# Patient Record
Sex: FEMALE | Race: WHITE | Employment: FULL TIME | URBAN - METROPOLITAN AREA
[De-identification: names, ages, dates, MRNs, and addresses within clinical notes are randomized per-mention and may not be internally consistent; named-entity substitution may affect disease eponyms.]

---

## 2023-03-27 LAB
ALANINE AMINOTRANSFERASE (SGPT) (U/L) IN SER/PLAS: 18 U/L (ref 7–45)
ALBUMIN (G/DL) IN SER/PLAS: 4.4 G/DL (ref 3.4–5)
ALKALINE PHOSPHATASE (U/L) IN SER/PLAS: 77 U/L (ref 33–110)
ANION GAP IN SER/PLAS: 15 MMOL/L (ref 10–20)
ASPARTATE AMINOTRANSFERASE (SGOT) (U/L) IN SER/PLAS: 17 U/L (ref 9–39)
BILIRUBIN TOTAL (MG/DL) IN SER/PLAS: 0.6 MG/DL (ref 0–1.2)
CALCIDIOL (25 OH VITAMIN D3) (NG/ML) IN SER/PLAS: 50 NG/ML
CALCIUM (MG/DL) IN SER/PLAS: 9.6 MG/DL (ref 8.6–10.6)
CARBON DIOXIDE, TOTAL (MMOL/L) IN SER/PLAS: 28 MMOL/L (ref 21–32)
CHLORIDE (MMOL/L) IN SER/PLAS: 103 MMOL/L (ref 98–107)
CHOLESTEROL (MG/DL) IN SER/PLAS: 195 MG/DL (ref 0–199)
CHOLESTEROL IN HDL (MG/DL) IN SER/PLAS: 51.8 MG/DL
CHOLESTEROL/HDL RATIO: 3.8
CREATININE (MG/DL) IN SER/PLAS: 0.65 MG/DL (ref 0.5–1.05)
ESTIMATED AVERAGE GLUCOSE FOR HBA1C: 126 MG/DL
GFR FEMALE: >90 ML/MIN/1.73M2
GLUCOSE (MG/DL) IN SER/PLAS: 129 MG/DL (ref 74–99)
HEMOGLOBIN A1C/HEMOGLOBIN TOTAL IN BLOOD: 6 %
LDL: 115 MG/DL (ref 0–99)
POTASSIUM (MMOL/L) IN SER/PLAS: 4.7 MMOL/L (ref 3.5–5.3)
PROTEIN TOTAL: 7.2 G/DL (ref 6.4–8.2)
SODIUM (MMOL/L) IN SER/PLAS: 141 MMOL/L (ref 136–145)
THYROTROPIN (MIU/L) IN SER/PLAS BY DETECTION LIMIT <= 0.05 MIU/L: 3.71 MIU/L (ref 0.44–3.98)
TRIGLYCERIDE (MG/DL) IN SER/PLAS: 139 MG/DL (ref 0–149)
UREA NITROGEN (MG/DL) IN SER/PLAS: 19 MG/DL (ref 6–23)
VLDL: 28 MG/DL (ref 0–40)

## 2023-09-25 LAB
ANION GAP IN SER/PLAS: 14 MMOL/L (ref 10–20)
CALCIUM (MG/DL) IN SER/PLAS: 9.7 MG/DL (ref 8.6–10.6)
CARBON DIOXIDE, TOTAL (MMOL/L) IN SER/PLAS: 28 MMOL/L (ref 21–32)
CHLORIDE (MMOL/L) IN SER/PLAS: 103 MMOL/L (ref 98–107)
CREATININE (MG/DL) IN SER/PLAS: 0.63 MG/DL (ref 0.5–1.05)
ESTIMATED AVERAGE GLUCOSE FOR HBA1C: 111 MG/DL
GFR FEMALE: >90 ML/MIN/1.73M2
GLUCOSE (MG/DL) IN SER/PLAS: 117 MG/DL (ref 74–99)
HEMOGLOBIN A1C/HEMOGLOBIN TOTAL IN BLOOD: 5.5 %
POTASSIUM (MMOL/L) IN SER/PLAS: 4.3 MMOL/L (ref 3.5–5.3)
SODIUM (MMOL/L) IN SER/PLAS: 141 MMOL/L (ref 136–145)
UREA NITROGEN (MG/DL) IN SER/PLAS: 15 MG/DL (ref 6–23)

## 2023-10-02 DIAGNOSIS — M25.562 CHRONIC PAIN OF LEFT KNEE: Primary | ICD-10-CM

## 2023-10-02 DIAGNOSIS — F41.9 ANXIETY: ICD-10-CM

## 2023-10-02 DIAGNOSIS — G89.29 CHRONIC PAIN OF LEFT KNEE: Primary | ICD-10-CM

## 2023-10-02 RX ORDER — BUSPIRONE HYDROCHLORIDE 5 MG/1
1 TABLET ORAL 3 TIMES DAILY
COMMUNITY
Start: 2021-06-02 | End: 2023-10-02 | Stop reason: SDUPTHER

## 2023-10-02 RX ORDER — MELOXICAM 7.5 MG/1
7.5 TABLET ORAL DAILY
Qty: 30 TABLET | Refills: 1 | Status: SHIPPED | OUTPATIENT
Start: 2023-10-02 | End: 2023-12-01

## 2023-10-02 RX ORDER — BUSPIRONE HYDROCHLORIDE 5 MG/1
5 TABLET ORAL 3 TIMES DAILY
Qty: 90 TABLET | Refills: 1 | Status: SHIPPED | OUTPATIENT
Start: 2023-10-02 | End: 2023-12-04 | Stop reason: SDUPTHER

## 2023-10-02 RX ORDER — MELOXICAM 7.5 MG/1
7.5 TABLET ORAL 2 TIMES DAILY PRN
COMMUNITY
Start: 2023-09-05 | End: 2023-10-02 | Stop reason: SDUPTHER

## 2023-10-27 DIAGNOSIS — E03.9 ACQUIRED HYPOTHYROIDISM: Primary | ICD-10-CM

## 2023-10-27 PROBLEM — F51.04 PSYCHOPHYSIOLOGICAL INSOMNIA: Status: ACTIVE | Noted: 2023-10-27

## 2023-10-27 PROBLEM — T75.3XXA MOTION SICKNESS: Status: ACTIVE | Noted: 2023-10-27

## 2023-10-27 PROBLEM — H93.13 TINNITUS OF BOTH EARS: Status: ACTIVE | Noted: 2023-10-27

## 2023-10-27 PROBLEM — F41.9 ANXIETY AND DEPRESSION: Status: ACTIVE | Noted: 2023-10-27

## 2023-10-27 PROBLEM — H90.3 SENSORINEURAL HEARING LOSS, BILATERAL: Status: ACTIVE | Noted: 2023-10-27

## 2023-10-27 PROBLEM — E78.5 HYPERLIPIDEMIA, MILD: Status: ACTIVE | Noted: 2023-10-27

## 2023-10-27 PROBLEM — R73.03 PREDIABETES: Status: ACTIVE | Noted: 2023-10-27

## 2023-10-27 PROBLEM — R42 DIZZINESS AND GIDDINESS: Status: ACTIVE | Noted: 2023-10-27

## 2023-10-27 PROBLEM — F32.A ANXIETY AND DEPRESSION: Status: ACTIVE | Noted: 2023-10-27

## 2023-10-27 PROBLEM — F41.1 GENERALIZED ANXIETY DISORDER: Status: ACTIVE | Noted: 2023-10-27

## 2023-10-29 RX ORDER — LEVOTHYROXINE SODIUM 50 UG/1
50 TABLET ORAL DAILY
Qty: 90 TABLET | Refills: 0 | Status: SHIPPED | OUTPATIENT
Start: 2023-10-29 | End: 2024-01-25

## 2023-11-01 RX ORDER — DAPSONE 50 MG/G
GEL TOPICAL
COMMUNITY
Start: 2023-10-25

## 2023-11-01 RX ORDER — METFORMIN HYDROCHLORIDE 500 MG/1
500 TABLET ORAL 2 TIMES DAILY
COMMUNITY
End: 2024-01-16 | Stop reason: SDUPTHER

## 2023-11-01 RX ORDER — CITALOPRAM 20 MG/1
20 TABLET, FILM COATED ORAL DAILY
COMMUNITY
End: 2024-03-25 | Stop reason: SDUPTHER

## 2023-11-01 RX ORDER — RALOXIFENE HYDROCHLORIDE 60 MG/1
60 TABLET, FILM COATED ORAL DAILY
COMMUNITY

## 2023-11-01 RX ORDER — VIT C/E/ZN/COPPR/LUTEIN/ZEAXAN 250MG-90MG
CAPSULE ORAL
COMMUNITY
Start: 2022-06-09

## 2023-11-01 RX ORDER — ZINC GLUCONATE 50 MG
1 TABLET ORAL DAILY
COMMUNITY
Start: 2022-06-09

## 2023-11-01 RX ORDER — PRAVASTATIN SODIUM 40 MG/1
40 TABLET ORAL EVERY EVENING
COMMUNITY
End: 2023-11-05 | Stop reason: SDUPTHER

## 2023-11-02 ENCOUNTER — EVALUATION (OUTPATIENT)
Dept: PHYSICAL THERAPY | Facility: CLINIC | Age: 58
End: 2023-11-02
Payer: COMMERCIAL

## 2023-11-02 DIAGNOSIS — M62.81 MUSCLE WEAKNESS ON EXAMINATION: ICD-10-CM

## 2023-11-02 DIAGNOSIS — M25.562 ACUTE PAIN OF LEFT KNEE: Primary | ICD-10-CM

## 2023-11-02 DIAGNOSIS — E78.5 HYPERLIPIDEMIA, MILD: Primary | ICD-10-CM

## 2023-11-02 PROCEDURE — 97110 THERAPEUTIC EXERCISES: CPT | Mod: GP

## 2023-11-02 PROCEDURE — 97161 PT EVAL LOW COMPLEX 20 MIN: CPT | Mod: GP

## 2023-11-02 NOTE — PROGRESS NOTES
Physical Therapy  Physical Therapy Orthopedic Evaluation    Patient Name: Destiny Naranjo  MRN: [id number]  Today's Date: 11/2/2023  Time Calculation  Start Time: 1120  Stop Time: 1210  Time Calculation (min): 50 min    Insurance:  Visit number: 1 of 50  Authorization info: yes  Insurance Type: [place]  Certification dates: after approval will know     Current Problem  1. Acute pain of left knee  Follow Up In Physical Therapy      2. Muscle weakness on examination  Follow Up In Physical Therapy          Referred by: Janae Lambert PA-C    General:     Left knee pain  Precautions:     Has a history of low back pain  Medical History Form: Reviewed (scanned into chart)    Subjective:   Patient  is a 59 yo female who presents today with c/o left knee pain.  Patient  reports that she has occasional buckling and feelings of giving out.  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  Patient  reports that that she started slowly favored her right leg because of the left leg.  She reports that she did get some shooting pain down the left with a change of brace.  Stopped wearing the short brace, wearing larger brace on and off ( recently stopped wearing the large brace), taking meloxicam and icing she had some mild improvement over the next two weeks.  Chief Complaint: left knee pain  Onset: over year  DAVID: nothing specific        Pain:     Location: lateral pain in the knee, proximal joint line, warmth around the medial joint line  Description: intermittent sharp pain, achiness  At rest: 2/10  At worst: 9/10   Aggravating Factors:  Bending/Straightening Knee, Standing, Walking, Sitting, Squatting, and Stairs, depending on foot location  Relieving Factors:  ice, larger brace, Voltaren gel , stopped brace for the last two weeks stopped all braces     Relevant Information (PMH & Previous Tests/Imaging): no x-rays  at this time  Previous Interventions/Treatments: brace, ( one brace helped, one brace made it worst)  ice, Voltaren gel ( has helped a lot)    Prior Level of Function (PLOF)  Patient previously independent with all ADLs  Exercise/Physical Activity: [facility] ( [employer]) but hard time getting there  Work/School:  [employer]id, [employer]'s work all day ( 2 hand weights)      Patients Living Environment: Reviewed and no concern   Primary Language: English  Patient's Goal(s) for Therapy:  don't wan to relay on medication and learn how to take care of her knee    Red Flags: Do you have any of the following? No  Fever/chills, unexplained weight changes, dizziness/fainting, unexplained change in bowel or bladder functions, unexplained malaise or muscle weakness, night pain/sweats, numbness or tingling    Objective:  Objective     Palpation:  positive TTP over the medial joint line and the lateral joint line of the left knee     Hip AROM  Hip AROM WFL: yes     Lumbar AROM WFL unless documented below     Hip AROM WFL unless documented below  Hip AROM WFL: yes  Hip PROM WFL unless documented below     Specific Lower Extremity MMT WFL unless documented below  R Iliopsoas: (5/5): 4-  L Iliopsoas: (5/5): 4-  R Gluteals (prone): (5/5): 3+ HK  L Gluteals (prone): (5/5): 3+ HK  R knee flexion: (5/5): 4  L knee flexion: (5/5): 4-  R knee extension: (5/5): 4  L knee extension: (5/5): 4-    Knee AROM WFL unless documented below  R knee flexion: (140°): no pain into resistance  L knee flexion: (140°): slight discomfort into resistance  R knee extension: (0°): 0  L knee extension: (0°): 5  Knee PROM WFL unless documented below     Knee MMT WFL unless documented below     DTR WFL unless documented below     Special Tests Negative unless documented below          Posture: WFL    Lower Extremity Functional Movements  Transfers: increase for effort sit to stand, slight shift to the right  Gait: slight decrease in stance time  on the left LE  Stairs:  increase in effort with leading with the left going up and down stairs, decrease in eccentric control on the left  SL Balance: will test next visit  DL Squat: decrease in strength on the left per patient, slight shift to the right  SL Squat: NT        Outcome Measures:  Other Measures  Lower Extremity Funtional Score (LEFS): 42/80     Treatments:  Access Code: [email or web address]  URL: [email or web address]  Date: 11/02/2023  Prepared by: Elida Lopez    Exercises  - Supine Heel Slides  - 4-7 x weekly - 1-3 sets - 12-15 reps  - Seated Knee Flexion Extension AROM   - 4 x weekly - 1-3 sets - 12-15 reps  - Supine Quad Set  - 4-7 x weekly - 1-3 sets - 12-15 reps - 6 hold  - Hooklying Straight Leg Raise  - 4 x weekly - 1-3 sets - 12-15 reps  - Long Sitting Calf Stretch with Strap  - 4 x weekly - 1-3 sets - 30-60  hold  - Standing Single Leg Stance with Unilateral Counter Support  - 4 x weekly - 1-3 sets - 5-10 reps - 10 hold  - Seated Long Arc Quad  - 4 x weekly - 1-3 sets - 12-15 reps    EDUCATION: Home exercise program, plan of care, activity modifications, pain management, and injury pathology  Education  Individual(s) Educated: Patient    Assessment: Patient presents with signs and symptoms consistent with left knee pain secondary to degenerative changes of the knee/mensicus , resulting in limited participation in pain-free ADLs and inability to perform at their prior level of function. Pt would benefit from physical therapy to address the impairments found & listed previously in the objective section in order to return to safe and pain-free ADLs and prior level of function.  PT Assessment Results: Decreased strength, Decreased range of motion, Impaired balance, Decreased mobility, Pain  Rehab Prognosis: Good    Plan:  Treatment/Interventions: Cryotherapy, Education/ Instruction, Electrical stimulation, Gait training, Manual therapy, Neuromuscular re-education, Therapeutic  exercises  PT Plan: Skilled PT  PT Frequency: 1 time per week  Duration: 8 weeks  Onset Date: 11/02/23  Rehab Potential: Good  Plan of Care Agreement: Patient  Planned Interventions include: therapeutic exercise, self-care home management, manual therapy, therapeutic activities, gait training, neuromuscular coordination, vasopneumatic, dry needling, aquatic therapy  Frequency: 1-2 x Week  Duration: 8 Weeks  Goals: Set and discussed today    1.  Patient will report < 2/10 in left knee pain with light physical activity.   2.  Patient will demonstrate  4+ /5 in left LE musculature.   3.  Patient will be able to stand on LE  in SLS with minimal UE assistance for >20 secs to improve dynamic stability necessary for LE dressing.   4.   Patient will be able to successfully ascent/descend 1 flight of stairs reciprocally with the use of 1 hand rail.   5.   Patient will report > 5 on GROC scale.   6.  Patient will be independent with HEP.   7.  Patient  will improve LEFS score form 42/80 to > 65/80.   8.  Patient  will be able to demonstrate full active knee extension without any increase in irritability.   Plan of care was developed with input and agreement by the patient      Elida Lopez, PT

## 2023-11-02 NOTE — Clinical Note
November 2, 2023     Patient: Destiny Naranjo   YOB: 1965   Date of Visit: 11/2/2023       To Whom It May Concern:    It is my medical opinion that Destiny Naranjo {Work release (duty restriction):11179}.    If you have any questions or concerns, please don't hesitate to call.         Sincerely,        Elida Lopez, PT    CC: No Recipients

## 2023-11-02 NOTE — Clinical Note
November 2, 2023     Patient: Destiny Naranjo   YOB: 1965   Date of Visit: 11/2/2023       To Whom it May Concern:    Destiny Naranjo was seen in my clinic on 11/2/2023. She {Return to school/sport:07336}.    If you have any questions or concerns, please don't hesitate to call.         Sincerely,          Elida Lopez, PT        CC: No Recipients

## 2023-11-05 RX ORDER — PRAVASTATIN SODIUM 40 MG/1
40 TABLET ORAL NIGHTLY
Qty: 90 TABLET | Refills: 0 | Status: SHIPPED | OUTPATIENT
Start: 2023-11-05 | End: 2024-02-05

## 2023-11-16 ENCOUNTER — APPOINTMENT (OUTPATIENT)
Dept: PHYSICAL THERAPY | Facility: CLINIC | Age: 58
End: 2023-11-16
Payer: COMMERCIAL

## 2023-11-21 ENCOUNTER — TREATMENT (OUTPATIENT)
Dept: PHYSICAL THERAPY | Facility: CLINIC | Age: 58
End: 2023-11-21
Payer: COMMERCIAL

## 2023-11-21 DIAGNOSIS — M25.562 ACUTE PAIN OF LEFT KNEE: Primary | ICD-10-CM

## 2023-11-21 DIAGNOSIS — M62.81 MUSCLE WEAKNESS ON EXAMINATION: ICD-10-CM

## 2023-11-21 PROCEDURE — 97110 THERAPEUTIC EXERCISES: CPT | Mod: GP

## 2023-11-21 PROCEDURE — 97140 MANUAL THERAPY 1/> REGIONS: CPT | Mod: GP

## 2023-11-21 NOTE — PROGRESS NOTES
Physical Therapy  Physical Therapy Treatment Note    Patient Name: Destiny Naranjo  MRN: [id number]  Today's Date: 11/21/2023  Time Calculation  Start Time: 1145  Stop Time: 1230  Time Calculation (min): 45 min    Insurance:  Visit number: 2 of 50  Authorization no  Certification dates:      Current Problem  1. Acute pain of left knee  Follow Up In Physical Therapy      2. Muscle weakness on examination  Follow Up In Physical Therapy            Precautions     Has history of low back pain   Subjective:     Patient reports she will get lateral side of the left knee and some and intermittent periods of pain in left lower leg.      Pain       Objective:         Treatments:   - Bike x 6 min ROM  - passive ROM of the left knee  - s/l IT band with roller stick, and theragun left knee, lateral quad, posterior lateral hamstring region K tape for stabilization ( hands on total time 23 min)  - walk in the clinic   - review of rolling techniques in the bed  - review of HEP and guidelines        Assessment:    Pt had a decrease in knee soreness with the tape. She felt she had improvement in stability and greater confidence in the knee with the tape       Plan: continue to work on knee strengthening, hip strengthening, and core/trunk stability       Goals:  Active       PT Problem       PT Goal 1       Start:  11/02/23    Expected End:  01/01/24       1.  Patient will report < 2/10 in left knee pain with light physical activity.   2.  Patient will demonstrate  4+ /5 in left LE musculature.   3.  Patient will be able to stand on LE  in SLS with minimal UE assistance for >20 secs to improve dynamic stability necessary for LE dressing.   4.   Patient will be able to successfully ascent/descend 1 flight of stairs reciprocally with the use of 1 hand rail.   5.   Patient will report > 5 on GROC scale.   6.  Patient will be independent with HEP.   Plan of care was developed with input and agreement by the patient                   Elida Lopez, PT

## 2023-12-04 DIAGNOSIS — F41.9 ANXIETY: ICD-10-CM

## 2023-12-04 RX ORDER — BUSPIRONE HYDROCHLORIDE 5 MG/1
5 TABLET ORAL 3 TIMES DAILY
Qty: 90 TABLET | Refills: 1 | Status: SHIPPED | OUTPATIENT
Start: 2023-12-04 | End: 2024-02-01

## 2023-12-05 NOTE — PROGRESS NOTES
Physical Therapy  Physical Therapy Treatment Note    Patient Name: Destiny Naranjo  MRN: [id number]  Today's Date: 12/7/2023  Time Calculation  Start Time: 1200  Stop Time: 1245  Time Calculation (min): 45 min    Insurance:  Visit number: 3 of 12   Authorization no  Certification dates:      Current Problem  1. Acute pain of left knee        2. Muscle weakness on examination                Precautions     Has history of low back pain   Subjective:   Patient reports that the knee still has the lateral pain and will get the intermittent periods of sharp pain     Pain       Objective:         Treatments:   - Bike x 6 min ROM  - passive ROM of the left knee  - extensive review of taping technique, with video recording on how to perform,   Walk in clinic  - tendon loading program:  kick into strap hold for 45 secs off for 1 min 15 secs repeat 3 times ( loading program for home 3-5 times  - review of lactic acid build up and putting the legs up at night  - tendon loading program: knee extension x 45 secs on 1 min 15 secs x 3 reps  Hip abd x 45 secs on 1 min 15 secs x 3 reps   - - review of HEP and guidelines    Manual:  1  TE x 2        Assessment:         Pt had a decrease in pain in the left knee after the session.  Pt had a much better understanding on the proper taping technique today.  Going to try on her own at home.    Plan: continue to work on knee strengthening, hip strengthening, and core/trunk stability       Goals:  Active       PT Problem       PT Goal 1       Start:  11/02/23    Expected End:  01/01/24       1.  Patient will report < 2/10 in left knee pain with light physical activity.   2.  Patient will demonstrate  4+ /5 in left LE musculature.   3.  Patient will be able to stand on LE  in SLS with minimal UE assistance for >20 secs to improve dynamic stability necessary for LE dressing.   4.   Patient will be able to successfully ascent/descend 1 flight of stairs reciprocally with the use of 1 hand  ron.   5.   Patient will report > 5 on GROC scale.   6.  Patient will be independent with HEP.   Plan of care was developed with input and agreement by the patient                  Elida Lopez PT

## 2023-12-07 ENCOUNTER — TREATMENT (OUTPATIENT)
Dept: PHYSICAL THERAPY | Facility: CLINIC | Age: 58
End: 2023-12-07
Payer: COMMERCIAL

## 2023-12-07 DIAGNOSIS — M25.562 ACUTE PAIN OF LEFT KNEE: Primary | ICD-10-CM

## 2023-12-07 DIAGNOSIS — M62.81 MUSCLE WEAKNESS ON EXAMINATION: ICD-10-CM

## 2023-12-07 PROCEDURE — 97110 THERAPEUTIC EXERCISES: CPT | Mod: GP

## 2023-12-07 PROCEDURE — 97140 MANUAL THERAPY 1/> REGIONS: CPT | Mod: GP

## 2023-12-08 NOTE — PROGRESS NOTES
Physical Therapy  Physical Therapy Treatment Note    Patient Name: Destiny Naranjo  MRN: [id number]  Today's Date: 12/12/2023  Time Calculation  Start Time: 1245  Stop Time: 1330  Time Calculation (min): 45 min    Insurance:  Visit number: 4 of 12   Authorization no  Certification dates:      Current Problem  1. Acute pain of left knee        2. Muscle weakness on examination                  Precautions     Has history of low back pain   Subjective:     She reports that the knee is still sore with full knee extension without a towel under the knee.   Pain       Objective:         Treatments:   - Bike x 6 min 2 hills level 2   - shuttle recovery hold 25 lbs x 30 reps,   - quad set into medium bolster x 10 secs x 15 reps  - quad set with SLR x 12 reps   - mobilization with movement Extension x with holds   - STM over the lateral knee, fibular head, lateral IT band x hands on 8 min         Manual:  1  TE x 2        Assessment:         Patient had increase in reactivity down the lateral lower leg with deeper knee flexion on the shuttle. It went away almost immediately once she got up. Had better tolerance of the quad set in general     Plan: continue to work on knee strengthening, hip strengthening, and core/trunk stability       Goals:  Active       PT Problem       PT Goal 1       Start:  11/02/23    Expected End:  01/01/24       1.  Patient will report < 2/10 in left knee pain with light physical activity.   2.  Patient will demonstrate  4+ /5 in left LE musculature.   3.  Patient will be able to stand on LE  in SLS with minimal UE assistance for >20 secs to improve dynamic stability necessary for LE dressing.   4.   Patient will be able to successfully ascent/descend 1 flight of stairs reciprocally with the use of 1 hand rail.   5.   Patient will report > 5 on GROC scale.   6.  Patient will be independent with HEP.   Plan of care was developed with input and agreement by the patient                  Elida VALENCIA  John, PT

## 2023-12-12 ENCOUNTER — TREATMENT (OUTPATIENT)
Dept: PHYSICAL THERAPY | Facility: CLINIC | Age: 58
End: 2023-12-12
Payer: COMMERCIAL

## 2023-12-12 DIAGNOSIS — M62.81 MUSCLE WEAKNESS ON EXAMINATION: ICD-10-CM

## 2023-12-12 DIAGNOSIS — M25.562 ACUTE PAIN OF LEFT KNEE: Primary | ICD-10-CM

## 2023-12-12 PROCEDURE — 97110 THERAPEUTIC EXERCISES: CPT | Mod: GP

## 2023-12-12 PROCEDURE — 97140 MANUAL THERAPY 1/> REGIONS: CPT | Mod: GP

## 2023-12-26 ENCOUNTER — TREATMENT (OUTPATIENT)
Dept: PHYSICAL THERAPY | Facility: CLINIC | Age: 58
End: 2023-12-26
Payer: COMMERCIAL

## 2023-12-26 DIAGNOSIS — M62.81 MUSCLE WEAKNESS ON EXAMINATION: ICD-10-CM

## 2023-12-26 DIAGNOSIS — M25.562 ACUTE PAIN OF LEFT KNEE: Primary | ICD-10-CM

## 2023-12-26 PROCEDURE — 97140 MANUAL THERAPY 1/> REGIONS: CPT | Mod: GP

## 2023-12-26 PROCEDURE — 97110 THERAPEUTIC EXERCISES: CPT | Mod: GP

## 2023-12-26 NOTE — PROGRESS NOTES
Physical Therapy  Physical Therapy Treatment Note    Patient Name: Destiny Naranjo  MRN: [id number]  Today's Date: 12/26/2023  Time Calculation  Start Time: 1245  Stop Time: 1330  Time Calculation (min): 45 min    Insurance:  Visit number: 5 of 12   Authorization no  Certification dates:      Current Problem  1. Acute pain of left knee        2. Muscle weakness on examination                    Precautions     Has history of low back pain   Subjective:   Patient reports that she thinks she had a skin reaction with the tape. She feels better with tape, but had lots of itching, redness, and swelling at the ends ( tape she has at home)  Pain       Objective:         Treatments:   - extensive review of K tape, PT POC, tape pre, tension with the taping procedure  - LAQ x 2.5 lbs x 15 reps x 6 secs each side   -  wall slide hold 6 secs x 10 reps   - SLR x 12 reps with quad set  - mobilization with movement Extension x with holds, patellar mobs  - STM over the lateral knee, fibular head, lateral IT band x hands on 8 min         Manual:  1  TE x 2        Assessment:    Patient tolerated the session well.  Patient had not increase in reactivity during the session. Going to see if she has an allergic reaction with our tape and focused on placement of the tape with no tension at the anchors. Secondary to patient's continued pain in the lateral compartment, advised patient to discuss referral with primary to ortho specialist at this time     Plan: continue to work on knee strengthening, hip strengthening, and core/trunk stability       Goals:  Active       PT Problem       PT Goal 1       Start:  11/02/23    Expected End:  01/01/24       1.  Patient will report < 2/10 in left knee pain with light physical activity.   2.  Patient will demonstrate  4+ /5 in left LE musculature.   3.  Patient will be able to stand on LE  in SLS with minimal UE assistance for >20 secs to improve dynamic stability necessary for LE dressing.   4.    Patient will be able to successfully ascent/descend 1 flight of stairs reciprocally with the use of 1 hand rail.   5.   Patient will report > 5 on GROC scale.   6.  Patient will be independent with HEP.   Plan of care was developed with input and agreement by the patient                  Elida Lopez PT

## 2024-01-03 ENCOUNTER — APPOINTMENT (OUTPATIENT)
Dept: PRIMARY CARE | Facility: CLINIC | Age: 59
End: 2024-01-03
Payer: COMMERCIAL

## 2024-01-16 ENCOUNTER — TREATMENT (OUTPATIENT)
Dept: PHYSICAL THERAPY | Facility: CLINIC | Age: 59
End: 2024-01-16
Payer: COMMERCIAL

## 2024-01-16 ENCOUNTER — OFFICE VISIT (OUTPATIENT)
Dept: PRIMARY CARE | Facility: CLINIC | Age: 59
End: 2024-01-16
Payer: COMMERCIAL

## 2024-01-16 VITALS
DIASTOLIC BLOOD PRESSURE: 80 MMHG | RESPIRATION RATE: 16 BRPM | SYSTOLIC BLOOD PRESSURE: 124 MMHG | OXYGEN SATURATION: 99 % | HEART RATE: 76 BPM | WEIGHT: 163 LBS | BODY MASS INDEX: 27.98 KG/M2

## 2024-01-16 DIAGNOSIS — F32.A ANXIETY AND DEPRESSION: Primary | ICD-10-CM

## 2024-01-16 DIAGNOSIS — E03.9 HYPOTHYROIDISM, UNSPECIFIED TYPE: ICD-10-CM

## 2024-01-16 DIAGNOSIS — E11.9 DIABETES MELLITUS WITHOUT COMPLICATION (MULTI): ICD-10-CM

## 2024-01-16 DIAGNOSIS — M62.81 MUSCLE WEAKNESS ON EXAMINATION: ICD-10-CM

## 2024-01-16 DIAGNOSIS — E78.5 HYPERLIPIDEMIA, MILD: ICD-10-CM

## 2024-01-16 DIAGNOSIS — L30.9 DERMATITIS: ICD-10-CM

## 2024-01-16 DIAGNOSIS — F41.9 ANXIETY AND DEPRESSION: Primary | ICD-10-CM

## 2024-01-16 DIAGNOSIS — M25.562 ACUTE PAIN OF LEFT KNEE: Primary | ICD-10-CM

## 2024-01-16 PROCEDURE — 97140 MANUAL THERAPY 1/> REGIONS: CPT | Mod: GP

## 2024-01-16 PROCEDURE — 97110 THERAPEUTIC EXERCISES: CPT | Mod: GP

## 2024-01-16 PROCEDURE — 1036F TOBACCO NON-USER: CPT | Performed by: PHYSICIAN ASSISTANT

## 2024-01-16 PROCEDURE — 3079F DIAST BP 80-89 MM HG: CPT | Performed by: PHYSICIAN ASSISTANT

## 2024-01-16 PROCEDURE — 99214 OFFICE O/P EST MOD 30 MIN: CPT | Performed by: PHYSICIAN ASSISTANT

## 2024-01-16 PROCEDURE — 3074F SYST BP LT 130 MM HG: CPT | Performed by: PHYSICIAN ASSISTANT

## 2024-01-16 RX ORDER — METFORMIN HYDROCHLORIDE 500 MG/1
500 TABLET ORAL 2 TIMES DAILY
Qty: 180 TABLET | Refills: 1 | Status: SHIPPED | OUTPATIENT
Start: 2024-01-16

## 2024-01-16 ASSESSMENT — ENCOUNTER SYMPTOMS
BACK PAIN: 0
VOMITING: 0
CARDIOVASCULAR NEGATIVE: 1
HEADACHES: 0
NEUROLOGICAL NEGATIVE: 1
EYES NEGATIVE: 1
ENDOCRINE NEGATIVE: 1
ALLERGIC/IMMUNOLOGIC NEGATIVE: 1
ARTHRALGIAS: 0
OCCASIONAL FEELINGS OF UNSTEADINESS: 0
DIZZINESS: 0
PSYCHIATRIC NEGATIVE: 1
COUGH: 0
SHORTNESS OF BREATH: 0
NERVOUS/ANXIOUS: 0
LOSS OF SENSATION IN FEET: 0
CONSTITUTIONAL NEGATIVE: 1
NAUSEA: 0
MUSCULOSKELETAL NEGATIVE: 1
PALPITATIONS: 0
WHEEZING: 0
GASTROINTESTINAL NEGATIVE: 1
DEPRESSION: 0
RESPIRATORY NEGATIVE: 1
ABDOMINAL PAIN: 0
HEMATOLOGIC/LYMPHATIC NEGATIVE: 1

## 2024-01-16 ASSESSMENT — PATIENT HEALTH QUESTIONNAIRE - PHQ9
1. LITTLE INTEREST OR PLEASURE IN DOING THINGS: NOT AT ALL
SUM OF ALL RESPONSES TO PHQ9 QUESTIONS 1 AND 2: 0
2. FEELING DOWN, DEPRESSED OR HOPELESS: NOT AT ALL

## 2024-01-16 NOTE — PROGRESS NOTES
Subjective   Patient ID: Destiny Naranjo is a 58 y.o. female who presents for No chief complaint on file..    HPI     Review of Systems   Constitutional: Negative.    HENT: Negative.     Eyes: Negative.    Respiratory: Negative.  Negative for cough, shortness of breath and wheezing.    Cardiovascular: Negative.  Negative for chest pain and palpitations.   Gastrointestinal: Negative.  Negative for abdominal pain, nausea and vomiting.   Endocrine: Negative.    Genitourinary: Negative.    Musculoskeletal: Negative.  Negative for arthralgias and back pain.        Left knee and thigh pain    Skin: Negative.  Negative for rash.   Allergic/Immunologic: Negative.    Neurological: Negative.  Negative for dizziness and headaches.   Hematological: Negative.    Psychiatric/Behavioral: Negative.  The patient is not nervous/anxious.         Hx anxiety and depression        Objective   /80 (BP Location: Right arm, Patient Position: Sitting)   Pulse 76   Resp 16   Wt 73.9 kg (163 lb)   SpO2 99%   BMI 27.98 kg/m²     Physical Exam  Vitals and nursing note reviewed.   Constitutional:       Appearance: Normal appearance.   Cardiovascular:      Rate and Rhythm: Normal rate and regular rhythm.   Pulmonary:      Effort: Pulmonary effort is normal.      Breath sounds: Normal breath sounds.   Musculoskeletal:      Cervical back: Neck supple.   Skin:     Findings: Rash present.      Comments: Dry skin under left eye  - under chin and near lips as well    Neurological:      General: No focal deficit present.      Mental Status: She is alert and oriented to person, place, and time.   Psychiatric:         Mood and Affect: Mood normal.         Behavior: Behavior normal.         Thought Content: Thought content normal.         Judgment: Judgment normal.       Assessment/Plan   Problem List Items Addressed This Visit       Hypothyroidism    Hyperlipidemia, mild    Anxiety and depression - Primary     Other Visit Diagnoses        Diabetes mellitus without complication (CMS/HCC)        Relevant Medications    metFORMIN (Glucophage) 500 mg tablet    Dermatitis                 knee pain - RICE- not taking meloxicam - improving with  PT         .hypothyroid   -stable, controlled, continue medication as directed  - TSH UTD   -f/u in 3 months or sooner if needed      hyperlipidemia   -Continue current medication as directed  -Encouraged following a low-fat low-cholesterol diet   -Discussed the benefits of regular aerobic exercise and weight loss  -Encouraged following a low-carb healthy oil intake diet      anxiety and depression   -stable, controlled, continue medication as directed  Reviewed diagnostic impression, education about situational anxiety, depression, etiology, treatment recommendations including medication, therapy, course of treatment and prognosis  Reviewed r/b/a, possible side effects of the medication.   Patient is aware about the benefits outweigh the risks.  Advised to see therapy/psych   Patient is reminded that if there is SI to call 911 and get themselves to the closest ED for evaluation     prediabetes   -Stable, controlled, last A1c 5.5- only one metformin per day   -Ophthalmology for evaluation and management of diabetic eye changes   -Encouraged regular aerobic exercise weight loss and diabetic diet ADA  -Discussed diabetic education issues of long-term diabetic complications, hyperglycemic symptoms, diet, and dictations-side effects and need for compliance, importance of exercise and use and side effects of insulin with patient  -Follow up in 3-4 months should any issues arise before then f/u sooner      Dermatitis - followed by dermatology - OTC cortisone cream      complexity visit of 35+ minutes face-to-face with at least half of the time discussing  Results of my examination, clinical findings, impression and diagnoses discussed with the patient as well as results of the latest blood work. The patient was in  agreement with the plan and verbalized a good understanding of instructions and plans for treatment. At the end of the visit today, the patient felt that all questions had been answered satisfactorily. The patient was pleased with the visit and very appreciative for the care rendered.

## 2024-01-16 NOTE — PROGRESS NOTES
Physical Therapy  Physical Therapy Treatment Note    Patient Name: Destiny Naranjo  MRN: [id number]  Today's Date: 1/16/2024  Time Calculation  Start Time: 1145  Stop Time: 1230  Time Calculation (min): 45 min    Insurance:  Visit number: 6 of 12   Authorization no  Certification dates:      Current Problem  1. Acute pain of left knee        2. Muscle weakness on examination                      Precautions     Has history of low back pain   Subjective:   Still having the lateral leg pain, still unable to fully extend the knee, pain into resistance with full knee flexion  Will get fatigued after extended periods of standing   Pain     On average:  5-7/10 into extension, shooting pain lateral posterior ridge  Objective:   Knee extension:  unable to       Treatments:   - Bike x 5 min level 1  - standing gastroc stretch x 2 min   - standing lean in SLS 10 secs x 10 reps  - step up on 6 in step   - discussion regarding compression brace vs the taping  - heel slide x 25 reps A/AROM  - quad set x into small bolster x 15 reps   - Over the edge of the mat x distraction with mobilization x 8 min   - patellar mob med and lateral  - pre-gait with stance on the right side and focus on mobility on the right  - walk in the clinic   - review of HEP and guidelines            Manual:  1  TE x 2        Assessment:    Pt continues to have decrease in knee flexion and knee extension mobility. She has antalgic gait    Plan: continue to work on knee strengthening, hip strengthening, and core/trunk stability       Goals:  Active       PT Problem       PT Goal 1       Start:  11/02/23    Expected End:  01/01/24       1.  Patient will report < 2/10 in left knee pain with light physical activity.   2.  Patient will demonstrate  4+ /5 in left LE musculature.   3.  Patient will be able to stand on LE  in SLS with minimal UE assistance for >20 secs to improve dynamic stability necessary for LE dressing.   4.   Patient will be able to  successfully ascent/descend 1 flight of stairs reciprocally with the use of 1 hand rail.   5.   Patient will report > 5 on GROC scale.   6.  Patient will be independent with HEP.   Plan of care was developed with input and agreement by the patient                  Elida Lopez PT

## 2024-01-24 DIAGNOSIS — E03.9 ACQUIRED HYPOTHYROIDISM: ICD-10-CM

## 2024-01-25 RX ORDER — LEVOTHYROXINE SODIUM 50 UG/1
50 TABLET ORAL DAILY
Qty: 90 TABLET | Refills: 1 | Status: SHIPPED | OUTPATIENT
Start: 2024-01-25

## 2024-02-01 DIAGNOSIS — F41.9 ANXIETY: ICD-10-CM

## 2024-02-01 RX ORDER — BUSPIRONE HYDROCHLORIDE 5 MG/1
5 TABLET ORAL 3 TIMES DAILY
Qty: 90 TABLET | Refills: 1 | Status: SHIPPED | OUTPATIENT
Start: 2024-02-01 | End: 2024-04-01

## 2024-02-02 DIAGNOSIS — E78.5 HYPERLIPIDEMIA, MILD: ICD-10-CM

## 2024-02-05 RX ORDER — PRAVASTATIN SODIUM 40 MG/1
40 TABLET ORAL NIGHTLY
Qty: 90 TABLET | Refills: 0 | Status: SHIPPED | OUTPATIENT
Start: 2024-02-05 | End: 2024-05-08

## 2024-02-13 ENCOUNTER — TREATMENT (OUTPATIENT)
Dept: PHYSICAL THERAPY | Facility: CLINIC | Age: 59
End: 2024-02-13
Payer: COMMERCIAL

## 2024-02-13 DIAGNOSIS — M62.81 MUSCLE WEAKNESS ON EXAMINATION: ICD-10-CM

## 2024-02-13 DIAGNOSIS — M25.562 ACUTE PAIN OF LEFT KNEE: Primary | ICD-10-CM

## 2024-02-13 PROCEDURE — 97140 MANUAL THERAPY 1/> REGIONS: CPT | Mod: GP

## 2024-02-13 PROCEDURE — 97110 THERAPEUTIC EXERCISES: CPT | Mod: GP

## 2024-02-13 NOTE — PROGRESS NOTES
Physical Therapy  Physical Therapy Treatment Note    Patient Name: Destiny Naranjo  MRN: [id number]  Today's Date: 2/13/2024  Time Calculation  Start Time: 0130  Stop Time: 0220  Time Calculation (min): 50 min      PT Therapeutic Procedures Time Entry  Manual Therapy Time Entry: 8  Therapeutic Exercise Time Entry: 32          Insurance:  Visit number: 7 of 12 ( 5 in 2023)  Authorization no  Certification dates:    Payor: ANTHEM / Plan: AN[employer]M [facility] / Product Type: *No Product type* /       Current Problem  1. Acute pain of left knee        2. Muscle weakness on examination                        Precautions     Has history of low back pain   Subjective:   Pt has been stretching more and having a little less discomfort. She is modifying her activities.  Using better chair and not kneeling on the left knee.  Will get fatigued after extended periods of standing     Doing her exercises more at home    She did have an x-ray at the [facility]:  1.  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 school and Lm           Pain     On average:  3-2/10 into extension, shooting pain lateral posterior ridge  Objective:   Knee extension:  unable to       Treatments:   - Bike x 3 min level 1  - Bike upright x 3 min level 1   - walk in clinic in the clinic  ( improvement in active knee flexion)  - 4 in step up x 12 reps   - wall slide hold 6 secs x 10 reps   - supine gastroc stretch x 3 min   - manual OP for extension x hands on total 8 min   - walk in clinic with brace on, discussion brace fitting  - review of HEP and guidelines with biofreeze, HEP, soreness rules    -         Manual:  1  TE x 2        Assessment:    Pt had improvement in tolerance of passive OP in extension and improvement in tolerance of extension.  Going to increase size of  her brace to help with the fitting.  Has improvement in active flexion with amb    Plan: continue to work on knee strengthening, hip strengthening, and core/trunk stability       Goals:  Active       PT Problem       PT Goal 1       Start:  11/02/23    Expected End:  01/01/24       1.  Patient will report < 2/10 in left knee pain with light physical activity.   2.  Patient will demonstrate  4+ /5 in left LE musculature.   3.  Patient will be able to stand on LE  in SLS with minimal UE assistance for >20 secs to improve dynamic stability necessary for LE dressing.   4.   Patient will be able to successfully ascent/descend 1 flight of stairs reciprocally with the use of 1 hand rail.   5.   Patient will report > 5 on GROC scale.   6.  Patient will be independent with HEP.   Plan of care was developed with input and agreement by the patient                  Elida Lopez, PT

## 2024-03-04 NOTE — PROGRESS NOTES
Physical Therapy  Physical Therapy Treatment Note    Patient Name: Destiny Naranjo  MRN: [id number]  Today's Date: 3/4/2024                    Insurance:  Visit number: 8 of 50 ( 5 in 2023)  Authorization no  Certification dates:    Payor: CHRIS / Plan: [facility] / Product Type: *No Product type* /       Current Problem  1. Acute pain of left knee        2. Muscle weakness on examination                          Precautions     Has history of low back pain   Subjective:   Pt reports that she tried a larger compression brace and still had tightness and increase in soreness in the leg.  She reports that she did the zoo. She had more tightness in the leg after the leg but did not last more than 2 days.      She reports that she feels better using the stronger biofreeze ( will do it 2-3 times a day)    She reports overall she has seen improvement.  Trying to move it throughout the day.      Still avoiding the stairs, hard going up    Doing less twisting on the knee           Pain     On average:  3-4/10 into extension, shooting pain lateral posterior ridge, not as sharp as it was before, soreness will back down quicker than before     Objective:   Knee extension:   Knee flexion  Hip flex: 4-/5  Hip Ext:  4-/5  Hip abd:  4-/5  Hip add:  4-/5  KE:  4-/5  KF:  4-/5      Treatments:   - review of PT POC, symptoms  - Bike x 4 min level 1  - walk in clinic in the clinic  ( improvement in active knee flexion)  - LAQ x 4 lbs x 12 reps   - inchworm yellow x 15 reps x 2 sets  - forward step down x 3 in x 12 reps   - gastroc stretch x 2 min    Right SLS hold with circles  - LE assignment     - - review of HEP and guidelines with biofreeze, HEP, soreness rules    -           TE x 3        Assessment:    Overall she is doing much better in the knee. Still challenged with the stairs, but has improvement in LE stability and strength.  Going to continue to work on loaded activity    Plan: continue to work on knee strengthening,  hip strengthening, and core/trunk stability       Goals:  Active       PT Problem       PT Goal 1       Start:  11/02/23    Expected End:  01/01/24       1.  Patient will report < 2/10 in left knee pain with light physical activity.   2.  Patient will demonstrate  4+ /5 in left LE musculature.   3.  Patient will be able to stand on LE  in SLS with minimal UE assistance for >20 secs to improve dynamic stability necessary for LE dressing.   4.   Patient will be able to successfully ascent/descend 1 flight of stairs reciprocally with the use of 1 hand rail.   5.   Patient will report > 5 on GROC scale.   6.  Patient will be independent with HEP.   Plan of care was developed with input and agreement by the patient                  Elida Lopez, PT

## 2024-03-05 ENCOUNTER — TREATMENT (OUTPATIENT)
Dept: PHYSICAL THERAPY | Facility: CLINIC | Age: 59
End: 2024-03-05
Payer: COMMERCIAL

## 2024-03-05 DIAGNOSIS — M62.81 MUSCLE WEAKNESS ON EXAMINATION: ICD-10-CM

## 2024-03-05 DIAGNOSIS — M25.562 ACUTE PAIN OF LEFT KNEE: Primary | ICD-10-CM

## 2024-03-05 PROCEDURE — 97110 THERAPEUTIC EXERCISES: CPT | Mod: GP

## 2024-03-25 ENCOUNTER — LAB (OUTPATIENT)
Dept: LAB | Facility: LAB | Age: 59
End: 2024-03-25
Payer: COMMERCIAL

## 2024-03-25 ENCOUNTER — OFFICE VISIT (OUTPATIENT)
Dept: PRIMARY CARE | Facility: CLINIC | Age: 59
End: 2024-03-25
Payer: COMMERCIAL

## 2024-03-25 VITALS
DIASTOLIC BLOOD PRESSURE: 70 MMHG | WEIGHT: 154.32 LBS | RESPIRATION RATE: 16 BRPM | SYSTOLIC BLOOD PRESSURE: 124 MMHG | BODY MASS INDEX: 26.49 KG/M2 | OXYGEN SATURATION: 98 % | HEART RATE: 76 BPM

## 2024-03-25 DIAGNOSIS — Z11.59 NEED FOR HEPATITIS C SCREENING TEST: ICD-10-CM

## 2024-03-25 DIAGNOSIS — F32.A ANXIETY AND DEPRESSION: ICD-10-CM

## 2024-03-25 DIAGNOSIS — R73.03 PREDIABETES: Primary | ICD-10-CM

## 2024-03-25 DIAGNOSIS — E78.5 HYPERLIPIDEMIA, MILD: ICD-10-CM

## 2024-03-25 DIAGNOSIS — F41.9 ANXIETY AND DEPRESSION: ICD-10-CM

## 2024-03-25 DIAGNOSIS — E03.9 HYPOTHYROIDISM, UNSPECIFIED TYPE: ICD-10-CM

## 2024-03-25 DIAGNOSIS — F41.1 GENERALIZED ANXIETY DISORDER: ICD-10-CM

## 2024-03-25 LAB
HCV AB SER QL: NONREACTIVE
TSH SERPL-ACNC: 3.3 MIU/L (ref 0.44–3.98)

## 2024-03-25 PROCEDURE — 99214 OFFICE O/P EST MOD 30 MIN: CPT | Performed by: PHYSICIAN ASSISTANT

## 2024-03-25 PROCEDURE — 84443 ASSAY THYROID STIM HORMONE: CPT

## 2024-03-25 PROCEDURE — 86803 HEPATITIS C AB TEST: CPT

## 2024-03-25 PROCEDURE — 36415 COLL VENOUS BLD VENIPUNCTURE: CPT

## 2024-03-25 PROCEDURE — 1036F TOBACCO NON-USER: CPT | Performed by: PHYSICIAN ASSISTANT

## 2024-03-25 RX ORDER — CITALOPRAM 20 MG/1
20 TABLET, FILM COATED ORAL DAILY
Qty: 90 TABLET | Refills: 1 | Status: SHIPPED | OUTPATIENT
Start: 2024-03-25 | End: 2024-03-27 | Stop reason: SDUPTHER

## 2024-03-25 ASSESSMENT — ENCOUNTER SYMPTOMS
EYES NEGATIVE: 1
ARTHRALGIAS: 0
NAUSEA: 0
ABDOMINAL PAIN: 0
ALLERGIC/IMMUNOLOGIC NEGATIVE: 1
MUSCULOSKELETAL NEGATIVE: 1
PSYCHIATRIC NEGATIVE: 1
ENDOCRINE NEGATIVE: 1
SHORTNESS OF BREATH: 0
BACK PAIN: 0
COUGH: 0
PALPITATIONS: 0
RESPIRATORY NEGATIVE: 1
CONSTITUTIONAL NEGATIVE: 1
GASTROINTESTINAL NEGATIVE: 1
CARDIOVASCULAR NEGATIVE: 1
HEADACHES: 0
WHEEZING: 0
HEMATOLOGIC/LYMPHATIC NEGATIVE: 1
NEUROLOGICAL NEGATIVE: 1
DIZZINESS: 0
NERVOUS/ANXIOUS: 0
VOMITING: 0

## 2024-03-25 NOTE — PROGRESS NOTES
Subjective   Patient ID: Destiny Naranjo is a 59 y.o. female who presents for Follow-up.    HPI   DESTINY GLEASON, 59 year old female is here today for follow up      hypothyroid,- on synthroid - stable  anxiety/depression, insomnia,- controlled on celexa and buspar - no psych or counseling -refill today   prediabetes, A1c 5.6 - on metformin 500mg once daily   hyperlipidemia - on statin   some left knee pain -- now better --comes and goes -  ?? IT band - pain at lateral thigh hip to knee -- n using brace and advil 400mg as needed   Takes glucosamine   Also being active and losing wt   patient states feeling well otherwise  denies fever, chills, N/V, headache, dizziness, CP, SOB, palpitations, edema, numbness, tingling, weakness  A chaperone was offered to the patient for the physical exam /  exam and was declined -     Review of Systems   Constitutional: Negative.    HENT: Negative.     Eyes: Negative.    Respiratory: Negative.  Negative for cough, shortness of breath and wheezing.    Cardiovascular: Negative.  Negative for chest pain and palpitations.   Gastrointestinal: Negative.  Negative for abdominal pain, nausea and vomiting.   Endocrine: Negative.    Genitourinary: Negative.    Musculoskeletal: Negative.  Negative for arthralgias and back pain.   Skin: Negative.  Negative for rash.   Allergic/Immunologic: Negative.    Neurological: Negative.  Negative for dizziness and headaches.   Hematological: Negative.    Psychiatric/Behavioral: Negative.  The patient is not nervous/anxious.        Objective   /70 (BP Location: Right arm, Patient Position: Sitting)   Pulse 76   Resp 16   Wt 70 kg (154 lb 5.2 oz)   SpO2 98%   BMI 26.49 kg/m²     Physical Exam  Constitutional:       Appearance: Normal appearance.   HENT:      Head: Normocephalic and atraumatic.   Cardiovascular:      Rate and Rhythm: Normal rate and regular rhythm.      Heart sounds: Normal heart sounds.   Pulmonary:      Effort:  Pulmonary effort is normal.      Breath sounds: Normal breath sounds.   Abdominal:      General: Bowel sounds are normal.      Palpations: Abdomen is soft.   Musculoskeletal:         General: Normal range of motion.      Cervical back: Neck supple.   Neurological:      General: No focal deficit present.      Mental Status: She is alert and oriented to person, place, and time.   Psychiatric:         Mood and Affect: Mood normal.         Behavior: Behavior normal.         Thought Content: Thought content normal.         Judgment: Judgment normal.         Assessment/Plan   Problem List Items Addressed This Visit       Hypothyroidism    Hyperlipidemia, mild    Generalized anxiety disorder    Anxiety and depression    Relevant Medications    citalopram (CeleXA) 20 mg tablet    Prediabetes - Primary        knee pain -chronic - ? IT band syndrome - RICE _ stable - no recent pain- doing well        .hypothyroid   -stable, controlled, continue medication as directed  - TSH due   -f/u in 6 months or sooner if needed      hyperlipidemia   -Continue current medication as directed  -Encouraged following a low-fat low-cholesterol diet   -Discussed the benefits of regular aerobic exercise and weight loss  -Encouraged following a low-carb healthy oil intake diet      anxiety and depression   -stable, controlled, continue medication as directed  - RX sent 90 day 1 R   -Discussed medication dosage, usage, goals of therapy, and side effects  Reviewed diagnostic impression, education about situational anxiety, depression, etiology, treatment recommendations including medication, therapy, course of treatment and prognosis  Reviewed r/b/a, possible side effects of the medication.   Patient is aware about the benefits outweigh the risks.  Advised to see therapy/psych   Patient is reminded that if there is SI to call 911 and get themselves to the closest ED for evaluation     prediabetes   -Stable, controlled, last A1c 5.5 - 9/2023    -Ophthalmology for evaluation and management of diabetic eye changes   -Encouraged regular aerobic exercise weight loss and diabetic diet ADA  -Discussed diabetic education issues of long-term diabetic complications, hyperglycemic symptoms, diet, and dictations-side effects and need for compliance, importance of exercise and use and side effects of insulin with patient  -Follow up in 3-4 months should any issues arise before then f/u sooner         complexity visit of 35+ minutes face-to-face with at least half of the time discussing  Results of my examination, clinical findings, impression and diagnoses discussed with the patient as well as results of the latest blood work. The patient was in agreement with the plan and verbalized a good understanding of instructions and plans for treatment. At the end of the visit today, the patient felt that all questions had been answered satisfactorily. The patient was pleased with the visit and very appreciative for the care rendered.

## 2024-03-26 ENCOUNTER — TREATMENT (OUTPATIENT)
Dept: PHYSICAL THERAPY | Facility: CLINIC | Age: 59
End: 2024-03-26
Payer: COMMERCIAL

## 2024-03-26 DIAGNOSIS — M62.81 MUSCLE WEAKNESS ON EXAMINATION: ICD-10-CM

## 2024-03-26 DIAGNOSIS — M25.562 ACUTE PAIN OF LEFT KNEE: Primary | ICD-10-CM

## 2024-03-26 PROCEDURE — 97110 THERAPEUTIC EXERCISES: CPT | Mod: GP

## 2024-03-26 NOTE — PROGRESS NOTES
Physical Therapy  Physical Therapy Treatment Note    Patient Name: Destiny Naranjo  MRN: [id number]  Today's Date: 3/26/2024  Time Calculation  Start Time: 1145  Stop Time: 1230  Time Calculation (min): 45 min      PT Therapeutic Procedures Time Entry  Therapeutic Exercise Time Entry: 45          Insurance:  Visit number: 9 of 50 ( 5 in 2023)  Authorization no  Certification dates:    Payor: CHRIS / Plan: [facility] / Product Type: *No Product type* /       Current Problem  1. Acute pain of left knee        2. Muscle weakness on examination                            Precautions     Has history of low back pain   Subjective:   Pt had a follow up with her primary yesterday.  Primary feels she might be demonstrating IT band syndrome      Pain     On average:  3-4/10 into extension, shooting pain lateral posterior ridge, not as sharp as it was before, soreness will back down quicker than before     Objective:       Freddy's test:  negative  Minor TTP over the left IT band    Hip abd:  4-/5 left side    + nerve tension sign on the left  Treatments:   - review of PT POC, symptoms  - Bike x 5 min level 1  - left leg assessment  - hip abd belt hold 10 secs x 10 reps   - review of sleeping guidelines  - sciatic nerve glide x 10 secs x 10   - bridge x 12 reps   -LAQ x 4 lbs x 12   - review of HEP and guidelines with biofreeze, HEP, soreness rules    -           TE x 3        Assessment:    Pt had low reactivity in the IT band region today.  She will get active nerve tension signs today.  Going to increase active nerve glides and increase outer hip strengthening to see how her left leg tolerates the knee activity.      Plan: continue to work on knee strengthening, hip strengthening, and core/trunk stability       Goals:  Active       PT Problem       PT Goal 1       Start:  11/02/23    Expected End:  01/01/24       1.  Patient will report < 2/10 in left knee pain with light physical activity.   2.  Patient will demonstrate   4+ /5 in left LE musculature.   3.  Patient will be able to stand on LE  in SLS with minimal UE assistance for >20 secs to improve dynamic stability necessary for LE dressing.   4.   Patient will be able to successfully ascent/descend 1 flight of stairs reciprocally with the use of 1 hand rail.   5.   Patient will report > 5 on GROC scale.   6.  Patient will be independent with HEP.   Plan of care was developed with input and agreement by the patient                  Elida Lopez PT

## 2024-03-27 DIAGNOSIS — F32.A ANXIETY AND DEPRESSION: ICD-10-CM

## 2024-03-27 DIAGNOSIS — F41.9 ANXIETY AND DEPRESSION: ICD-10-CM

## 2024-03-28 RX ORDER — CITALOPRAM 20 MG/1
20 TABLET, FILM COATED ORAL DAILY
Qty: 90 TABLET | Refills: 1 | Status: SHIPPED | OUTPATIENT
Start: 2024-03-28

## 2024-03-29 DIAGNOSIS — F41.9 ANXIETY: ICD-10-CM

## 2024-04-01 RX ORDER — BUSPIRONE HYDROCHLORIDE 5 MG/1
5 TABLET ORAL 3 TIMES DAILY
Qty: 90 TABLET | Refills: 0 | Status: SHIPPED | OUTPATIENT
Start: 2024-04-01 | End: 2024-05-08

## 2024-04-16 ENCOUNTER — TREATMENT (OUTPATIENT)
Dept: PHYSICAL THERAPY | Facility: CLINIC | Age: 59
End: 2024-04-16
Payer: COMMERCIAL

## 2024-04-16 DIAGNOSIS — M25.562 ACUTE PAIN OF LEFT KNEE: ICD-10-CM

## 2024-04-16 DIAGNOSIS — M62.81 MUSCLE WEAKNESS ON EXAMINATION: ICD-10-CM

## 2024-04-16 PROCEDURE — 97110 THERAPEUTIC EXERCISES: CPT | Mod: GP

## 2024-04-16 PROCEDURE — 97140 MANUAL THERAPY 1/> REGIONS: CPT | Mod: GP

## 2024-04-16 NOTE — PROGRESS NOTES
Physical Therapy  Physical Therapy Treatment Note    Patient Name: Destiny Naranjo  MRN: [id number]  Today's Date: 4/16/2024  Time Calculation  Start Time: 1105  Stop Time: 1145  Time Calculation (min): 40 min      PT Therapeutic Procedures Time Entry  Manual Therapy Time Entry: 25  Therapeutic Exercise Time Entry: 13          Insurance:  Visit number: 10 of 50 ( 5 in 2023)  Authorization no  Certification dates:    Payor: ANTHEM / Plan: [facility]M [facility] / Product Type: *No Product type* /       Current Problem  1. Acute pain of left knee  Follow Up In Physical Therapy      2. Muscle weakness on examination  Follow Up In Physical Therapy                          Precautions     Has history of low back pain   Subjective:    Pt was using professional biofreeze on her knee and has been feeling better overall.  She reports that she has been very tired with both jobs overall and she will feel achy in the knee at the end of the day      Pain     On average:  3-4/10 into extension, shooting pain lateral posterior ridge, not as sharp as it was before, soreness will back down quicker than before     Objective:       Myofascial trigger points revealed in the left quadriceps ( VL, VM) muscle(s) by dry needle technique with noted needle fibrillation, local twitch response, reproduction of symptoms including but not limited to achiness, burning and electricity.  Noted are atypical tissue morphology characteristics of abnormal density, palpable margins, contracted/fibrotic muscle and fascial tissue with resistance to penetration.  These characteristics reflect abnormal tissue function, innervation and nervous system communication   Treatments:   - review of PT POC, symptoms  - Bike x 6 min level 1  - review of symptoms  - Trigger point needling was performed to the following muscles  VL, VM, anterior knee, 7 point star technique performed.  All trigger/tender points were marked and noted. Patient was prepped with 70% Isopropyl  alcohol to the site(s). Sterile, single use, solid filament needles were inserted at various depths and angles to release tight tissue, improve microcirculation and remove neuro-noxious chemicals via a myofascial twitch response. Total time needles in 4 min (not charged)  - manual STM over the anterior thigh, lateral thigh, medial thigh hands on 25 minutes with STM,   - PROM of the left knee,  - walk in the clinic  - review of HEP and guidelines, positions to avoid, activities to avoid            Manual x 2  TE x 1        Assessment:    Pt reports a decrease in stiffness in the knee and improvement in active extension after the session.     Plan: continue to work on knee strengthening, hip strengthening, and core/trunk stability       Goals:  Active       PT Problem       PT Goal 1       Start:  11/02/23    Expected End:  01/01/24       1.  Patient will report < 2/10 in left knee pain with light physical activity.   2.  Patient will demonstrate  4+ /5 in left LE musculature.   3.  Patient will be able to stand on LE  in SLS with minimal UE assistance for >20 secs to improve dynamic stability necessary for LE dressing.   4.   Patient will be able to successfully ascent/descend 1 flight of stairs reciprocally with the use of 1 hand rail.   5.   Patient will report > 5 on GROC scale.   6.  Patient will be independent with HEP.   Plan of care was developed with input and agreement by the patient                  Elida Lopez, PT

## 2024-05-07 ENCOUNTER — TREATMENT (OUTPATIENT)
Dept: PHYSICAL THERAPY | Facility: CLINIC | Age: 59
End: 2024-05-07
Payer: COMMERCIAL

## 2024-05-07 DIAGNOSIS — M25.562 ACUTE PAIN OF LEFT KNEE: Primary | ICD-10-CM

## 2024-05-07 DIAGNOSIS — F41.9 ANXIETY: ICD-10-CM

## 2024-05-07 DIAGNOSIS — E78.5 HYPERLIPIDEMIA, MILD: ICD-10-CM

## 2024-05-07 DIAGNOSIS — M62.81 MUSCLE WEAKNESS ON EXAMINATION: ICD-10-CM

## 2024-05-07 PROCEDURE — 97110 THERAPEUTIC EXERCISES: CPT | Mod: GP

## 2024-05-07 NOTE — PROGRESS NOTES
Physical Therapy  Physical Therapy Treatment Note    Patient Name: Destiny Naranjo  MRN: [id number]  /Today's Date: 5/7/24  Time Calculation  Start Time: 1100  Stop Time: 1145  Time Calculation (min): 45 min      PT Therapeutic Procedures Time Entry  Therapeutic Exercise Time Entry: 40          Insurance:  Visit number: 11 of 50 ( 5 in 2023)  Authorization no  Certification dates:    Payor: CHRIS / Plan: [facility]M [facility] / Product Type: *No Product type* /       Current Problem  1. Acute pain of left knee  Follow Up In Physical Therapy      2. Muscle weakness on examination  Follow Up In Physical Therapy                            Precautions     Has history of low back pain   Subjective:    Overall she is doing better.  She reports that she hasn't had a lot of pain in the knee.  She has been working a lot    She has been doing her biofreeze 1-2 times a day and icing at night        Pain     On average:  3-4/10 into extension, shooting pain lateral posterior ridge, not as sharp as it was before, soreness will back down quicker than before     Objective:       Hip flex:  4/5  Hip ext:  4/5  Hip abd:  4-/5  Hip add:  4-/5  KE :   4+/5  KF:  4+/5        Treatments:   - review of PT POC, symptoms  - Bike x 6 min level 1  - review of symptoms  - LE assessment  - quad set into large bolster  - wall sit x 6 secs x 12 reps   - standing lateral steps x 20 reps  - left SLS hold 10 secs x 10   - discussion on use of compression stockings to help with leg soreness at the end of the day  - review of frequency for strengthening, HEP                  TE x 3        Assessment:    Pt was able to tolerate the session well. She had no increase any reactivity or irritability.  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  Patient will report < 2/10 in left knee pain with light physical activity.   2.  Patient will demonstrate  4+ /5 in left LE musculature.   3.  Patient will be able to stand on LE  in SLS with minimal UE assistance for >20 secs to improve dynamic stability necessary for LE dressing.   4.   Patient will be able to successfully ascent/descend 1 flight of stairs reciprocally with the use of 1 hand rail.   5.   Patient will report > 5 on GROC scale.   6.  Patient will be independent with HEP.   Plan of care was developed with input and agreement by the patient                  Elida Lopez PT

## 2024-05-08 RX ORDER — PRAVASTATIN SODIUM 40 MG/1
40 TABLET ORAL NIGHTLY
Qty: 90 TABLET | Refills: 0 | Status: SHIPPED | OUTPATIENT
Start: 2024-05-08

## 2024-05-08 RX ORDER — BUSPIRONE HYDROCHLORIDE 5 MG/1
5 TABLET ORAL 3 TIMES DAILY
Qty: 90 TABLET | Refills: 1 | Status: SHIPPED | OUTPATIENT
Start: 2024-05-08

## 2024-06-27 ENCOUNTER — TREATMENT (OUTPATIENT)
Dept: PHYSICAL THERAPY | Facility: CLINIC | Age: 59
End: 2024-06-27
Payer: COMMERCIAL

## 2024-06-27 DIAGNOSIS — M25.562 ACUTE PAIN OF LEFT KNEE: ICD-10-CM

## 2024-06-27 DIAGNOSIS — M62.81 MUSCLE WEAKNESS ON EXAMINATION: ICD-10-CM

## 2024-06-27 PROCEDURE — 97110 THERAPEUTIC EXERCISES: CPT | Mod: GP

## 2024-06-27 NOTE — PROGRESS NOTES
Physical Therapy  Physical Therapy Treatment Note    Patient Name: Destiny Naranjo  MRN: [id number]  /Today's Date: 24                    Insurance:  Visit number: 12 of 50 ( 5 in )  Authorization no  Certification dates:    Payor: CHRIS / Plan: [facility]RIS [facility] / Product Type: *No Product type* /       Current Problem  1. Acute pain of left knee  Follow Up In Physical Therapy      2. Muscle weakness on examination  Follow Up In Physical Therapy                              Precautions     Has history of low back pain   Subjective:    Overall she is doing better.  She reports that she has been able to get int the pool.      Pain     On average:  very little pain minimal discomfort,   Objective:       Hip flex:  4/5  Hip ext:  4/5  Hip abd:  4-/5  Hip add:  4-/5  KE :   4+/5  KF:  4+/5    Full knee flexion and extension    Stair reciprocal up and non reciprocal down   Treatments:   - review of PT POC, symptoms  - Bike x 6 min level 1  - LE assessment   - review of symptoms  - review of aquatic work out   - lateral step down on 3 in step x 10 reps  - forward step down on 3 in step down x 10 reps   - review of quad program to focus on   - discussion on use of compression stockings to help with leg soreness at the end of the day  - review of frequency for strengthening, HEP                  TE x 3        Assessment:    Patient demonstrated full independence with current PT program.  Patient was educated on the importance of continuing with their current HEP to prevent future complications.  Pt has met all of her current goals  Plan:     Discharge Summary    Name: Destiny Naranjo  MRN: [id number]  : 1965  Date: 24    Discharge Summary: PT        Rehab Discharge Reason: Achieved all and/or the most significant goals(s)      Goals:  Active       PT Problem       PT Goal 1       Start:  23    Expected End:  24       1.  Patient will report < 2/10 in left knee pain with light physical  activity.   2.  Patient will demonstrate  4+ /5 in left LE musculature.   3.  Patient will be able to stand on LE  in SLS with minimal UE assistance for >20 secs to improve dynamic stability necessary for LE dressing.   4.   Patient will be able to successfully ascent/descend 1 flight of stairs reciprocally with the use of 1 hand rail.   5.   Patient will report > 5 on GROC scale.   6.  Patient will be independent with HEP.   Plan of care was developed with input and agreement by the patient                  Elida Lopez, PT

## 2024-07-02 DIAGNOSIS — T75.3XXS MOTION SICKNESS, SEQUELA: Primary | ICD-10-CM

## 2024-07-02 RX ORDER — SCOLOPAMINE TRANSDERMAL SYSTEM 1 MG/1
1 PATCH, EXTENDED RELEASE TRANSDERMAL
Qty: 10 PATCH | Refills: 0 | Status: SHIPPED | OUTPATIENT
Start: 2024-07-02

## 2024-07-02 RX ORDER — SCOLOPAMINE TRANSDERMAL SYSTEM 1 MG/1
1 PATCH, EXTENDED RELEASE TRANSDERMAL
COMMUNITY
Start: 2022-06-09 | End: 2024-07-02 | Stop reason: SDUPTHER

## 2024-07-04 DIAGNOSIS — F41.9 ANXIETY: ICD-10-CM

## 2024-07-08 RX ORDER — BUSPIRONE HYDROCHLORIDE 5 MG/1
5 TABLET ORAL 3 TIMES DAILY
Qty: 90 TABLET | Refills: 0 | Status: SHIPPED | OUTPATIENT
Start: 2024-07-08

## 2024-07-09 ENCOUNTER — APPOINTMENT (OUTPATIENT)
Dept: PRIMARY CARE | Facility: CLINIC | Age: 59
End: 2024-07-09
Payer: COMMERCIAL

## 2024-07-15 ENCOUNTER — TELEPHONE (OUTPATIENT)
Dept: PRIMARY CARE | Facility: CLINIC | Age: 59
End: 2024-07-15
Payer: COMMERCIAL

## 2024-07-15 DIAGNOSIS — E03.9 ACQUIRED HYPOTHYROIDISM: ICD-10-CM

## 2024-07-15 RX ORDER — LEVOTHYROXINE SODIUM 50 UG/1
50 TABLET ORAL DAILY
Qty: 90 TABLET | Refills: 1 | Status: CANCELLED | OUTPATIENT
Start: 2024-07-15

## 2024-07-15 RX ORDER — LEVOTHYROXINE SODIUM 50 UG/1
50 TABLET ORAL DAILY
Qty: 90 TABLET | Refills: 0 | OUTPATIENT
Start: 2024-07-15

## 2024-07-16 DIAGNOSIS — E03.9 ACQUIRED HYPOTHYROIDISM: ICD-10-CM

## 2024-07-16 RX ORDER — LEVOTHYROXINE SODIUM 50 UG/1
50 TABLET ORAL DAILY
Qty: 90 TABLET | Refills: 1 | Status: SHIPPED | OUTPATIENT
Start: 2024-07-16

## 2024-08-05 DIAGNOSIS — F41.9 ANXIETY: ICD-10-CM

## 2024-08-05 RX ORDER — BUSPIRONE HYDROCHLORIDE 5 MG/1
5 TABLET ORAL 3 TIMES DAILY
Qty: 90 TABLET | Refills: 0 | Status: SHIPPED | OUTPATIENT
Start: 2024-08-05

## 2024-08-06 DIAGNOSIS — E78.5 HYPERLIPIDEMIA, MILD: ICD-10-CM

## 2024-08-06 RX ORDER — PRAVASTATIN SODIUM 40 MG/1
40 TABLET ORAL NIGHTLY
Qty: 90 TABLET | Refills: 0 | Status: SHIPPED | OUTPATIENT
Start: 2024-08-06

## 2024-08-16 ENCOUNTER — APPOINTMENT (OUTPATIENT)
Dept: OTOLARYNGOLOGY | Facility: CLINIC | Age: 59
End: 2024-08-16
Payer: COMMERCIAL

## 2024-08-16 ENCOUNTER — APPOINTMENT (OUTPATIENT)
Dept: AUDIOLOGY | Facility: CLINIC | Age: 59
End: 2024-08-16
Payer: COMMERCIAL

## 2024-09-02 DIAGNOSIS — F41.9 ANXIETY: ICD-10-CM

## 2024-09-03 RX ORDER — BUSPIRONE HYDROCHLORIDE 5 MG/1
5 TABLET ORAL 3 TIMES DAILY
Qty: 90 TABLET | Refills: 0 | Status: SHIPPED | OUTPATIENT
Start: 2024-09-03

## 2024-10-01 DIAGNOSIS — F41.9 ANXIETY: ICD-10-CM

## 2024-10-01 RX ORDER — BUSPIRONE HYDROCHLORIDE 5 MG/1
5 TABLET ORAL 3 TIMES DAILY
Qty: 90 TABLET | Refills: 0 | Status: SHIPPED | OUTPATIENT
Start: 2024-10-01

## 2024-10-01 ASSESSMENT — PROMIS GLOBAL HEALTH SCALE
RATE_GENERAL_HEALTH: EXCELLENT
RATE_PHYSICAL_HEALTH: EXCELLENT
RATE_QUALITY_OF_LIFE: EXCELLENT
RATE_AVERAGE_FATIGUE: MILD
RATE_AVERAGE_PAIN: 2
RATE_SOCIAL_SATISFACTION: VERY GOOD
EMOTIONAL_PROBLEMS: SOMETIMES
RATE_MENTAL_HEALTH: VERY GOOD
CARRYOUT_SOCIAL_ACTIVITIES: EXCELLENT
CARRYOUT_PHYSICAL_ACTIVITIES: COMPLETELY

## 2024-10-03 ENCOUNTER — APPOINTMENT (OUTPATIENT)
Dept: PRIMARY CARE | Facility: CLINIC | Age: 59
End: 2024-10-03
Payer: COMMERCIAL

## 2024-10-03 ENCOUNTER — LAB (OUTPATIENT)
Dept: LAB | Facility: LAB | Age: 59
End: 2024-10-03
Payer: COMMERCIAL

## 2024-10-03 VITALS
DIASTOLIC BLOOD PRESSURE: 69 MMHG | WEIGHT: 159.61 LBS | BODY MASS INDEX: 27.25 KG/M2 | OXYGEN SATURATION: 99 % | HEART RATE: 81 BPM | HEIGHT: 64 IN | SYSTOLIC BLOOD PRESSURE: 111 MMHG

## 2024-10-03 DIAGNOSIS — E78.5 HYPERLIPIDEMIA, MILD: ICD-10-CM

## 2024-10-03 DIAGNOSIS — E03.9 HYPOTHYROIDISM, UNSPECIFIED TYPE: ICD-10-CM

## 2024-10-03 DIAGNOSIS — F41.9 ANXIETY AND DEPRESSION: ICD-10-CM

## 2024-10-03 DIAGNOSIS — R73.03 PREDIABETES: ICD-10-CM

## 2024-10-03 DIAGNOSIS — Z00.00 ROUTINE GENERAL MEDICAL EXAMINATION AT A HEALTH CARE FACILITY: Primary | ICD-10-CM

## 2024-10-03 DIAGNOSIS — Z23 NEED FOR INFLUENZA VACCINATION: ICD-10-CM

## 2024-10-03 DIAGNOSIS — F32.A ANXIETY AND DEPRESSION: ICD-10-CM

## 2024-10-03 DIAGNOSIS — F41.1 GENERALIZED ANXIETY DISORDER: ICD-10-CM

## 2024-10-03 DIAGNOSIS — Z23 NEED FOR VACCINATION: ICD-10-CM

## 2024-10-03 LAB
ALBUMIN SERPL BCP-MCNC: 4.4 G/DL (ref 3.4–5)
ALP SERPL-CCNC: 79 U/L (ref 33–110)
ALT SERPL W P-5'-P-CCNC: 16 U/L (ref 7–45)
ANION GAP SERPL CALC-SCNC: 15 MMOL/L (ref 10–20)
AST SERPL W P-5'-P-CCNC: 19 U/L (ref 9–39)
BILIRUB SERPL-MCNC: 0.7 MG/DL (ref 0–1.2)
BUN SERPL-MCNC: 19 MG/DL (ref 6–23)
CALCIUM SERPL-MCNC: 9.5 MG/DL (ref 8.6–10.6)
CHLORIDE SERPL-SCNC: 102 MMOL/L (ref 98–107)
CHOLEST SERPL-MCNC: 205 MG/DL (ref 0–199)
CHOLESTEROL/HDL RATIO: 4.3
CO2 SERPL-SCNC: 27 MMOL/L (ref 21–32)
CREAT SERPL-MCNC: 0.56 MG/DL (ref 0.5–1.05)
EGFRCR SERPLBLD CKD-EPI 2021: >90 ML/MIN/1.73M*2
EST. AVERAGE GLUCOSE BLD GHB EST-MCNC: 111 MG/DL
GLUCOSE SERPL-MCNC: 114 MG/DL (ref 74–99)
HBA1C MFR BLD: 5.5 %
HDLC SERPL-MCNC: 48 MG/DL
LDLC SERPL CALC-MCNC: 121 MG/DL
NON HDL CHOLESTEROL: 157 MG/DL (ref 0–149)
POTASSIUM SERPL-SCNC: 4.2 MMOL/L (ref 3.5–5.3)
PROT SERPL-MCNC: 7.3 G/DL (ref 6.4–8.2)
SODIUM SERPL-SCNC: 140 MMOL/L (ref 136–145)
TRIGL SERPL-MCNC: 179 MG/DL (ref 0–149)
VLDL: 36 MG/DL (ref 0–40)

## 2024-10-03 PROCEDURE — 1036F TOBACCO NON-USER: CPT | Performed by: PHYSICIAN ASSISTANT

## 2024-10-03 PROCEDURE — 80053 COMPREHEN METABOLIC PANEL: CPT

## 2024-10-03 PROCEDURE — 83036 HEMOGLOBIN GLYCOSYLATED A1C: CPT

## 2024-10-03 PROCEDURE — 90656 IIV3 VACC NO PRSV 0.5 ML IM: CPT | Performed by: PHYSICIAN ASSISTANT

## 2024-10-03 PROCEDURE — 3008F BODY MASS INDEX DOCD: CPT | Performed by: PHYSICIAN ASSISTANT

## 2024-10-03 PROCEDURE — 90471 IMMUNIZATION ADMIN: CPT | Performed by: PHYSICIAN ASSISTANT

## 2024-10-03 PROCEDURE — 36415 COLL VENOUS BLD VENIPUNCTURE: CPT

## 2024-10-03 PROCEDURE — 99396 PREV VISIT EST AGE 40-64: CPT | Performed by: PHYSICIAN ASSISTANT

## 2024-10-03 PROCEDURE — 80061 LIPID PANEL: CPT

## 2024-10-03 ASSESSMENT — PATIENT HEALTH QUESTIONNAIRE - PHQ9
SUM OF ALL RESPONSES TO PHQ9 QUESTIONS 1 AND 2: 0
2. FEELING DOWN, DEPRESSED OR HOPELESS: NOT AT ALL
1. LITTLE INTEREST OR PLEASURE IN DOING THINGS: NOT AT ALL

## 2024-10-03 ASSESSMENT — ENCOUNTER SYMPTOMS
RESPIRATORY NEGATIVE: 1
DIZZINESS: 0
NERVOUS/ANXIOUS: 0
PALPITATIONS: 0
NAUSEA: 0
EYES NEGATIVE: 1
ARTHRALGIAS: 0
GASTROINTESTINAL NEGATIVE: 1
LOSS OF SENSATION IN FEET: 0
NEUROLOGICAL NEGATIVE: 1
CONSTITUTIONAL NEGATIVE: 1
COUGH: 0
ABDOMINAL PAIN: 0
HEADACHES: 0
DEPRESSION: 0
OCCASIONAL FEELINGS OF UNSTEADINESS: 0
ALLERGIC/IMMUNOLOGIC NEGATIVE: 1
PSYCHIATRIC NEGATIVE: 1
WHEEZING: 0
HEMATOLOGIC/LYMPHATIC NEGATIVE: 1
ENDOCRINE NEGATIVE: 1
CARDIOVASCULAR NEGATIVE: 1
VOMITING: 0
BACK PAIN: 0
MUSCULOSKELETAL NEGATIVE: 1
SHORTNESS OF BREATH: 0

## 2024-10-03 NOTE — PROGRESS NOTES
Subjective   Patient ID: Destiny Naranjo is a 59 y.o. female who presents for Annual Exam.    HPI   DESTINY GLEASON, 59 year old female is here today for annual physical exam and follow up      -  lepe and heleighann --  retired julia [employer] -- 22yo with autism   specialists - GYN - [facility] - breast specialist --[facility] --   UTD dental and vision glasses UTD       significant medical hx   hypothyroid,- on synthroid - tsh stable 3/2024  anxiety/depression, insomnia,- controlled on celexa and buspar - no psych or counseling   prediabetes, A1c 5.5- on metformin 500mg once daily   hyperlipidemia - on statin   some left knee pain -- now better -- using brace and advil 400mg once daily asa needed--      PShx: c section 2001--- breast bx on right breast   Social hx: etoh, no tobacco use, no illicit drug use   no specific diet and exercise-- none -   immunizations - had covid vaccine -   FMhx: breast cancer mother aunt and cousin --   Past medical, surgical, social, and family history all reviewed      patient states feeling well otherwise  denies fever, chills, N/V, headache, dizziness, CP, SOB, palpitations, edema, numbness, tingling, weakness  A chaperone was offered to the patient for the physical exam /  exam and was declined      Review of Systems   Constitutional: Negative.    HENT: Negative.     Eyes: Negative.    Respiratory: Negative.  Negative for cough, shortness of breath and wheezing.    Cardiovascular: Negative.  Negative for chest pain and palpitations.   Gastrointestinal: Negative.  Negative for abdominal pain, nausea and vomiting.   Endocrine: Negative.    Genitourinary: Negative.    Musculoskeletal: Negative.  Negative for arthralgias and back pain.        Left knee pain    Skin: Negative.  Negative for rash.   Allergic/Immunologic: Negative.    Neurological: Negative.  Negative for dizziness and headaches.   Hematological: Negative.    Psychiatric/Behavioral: Negative.  The  "patient is not nervous/anxious.        Objective   /69 (BP Location: Left arm, Patient Position: Sitting)   Pulse 81   Ht 1.626 m (5' 4\")   Wt 72.4 kg (159 lb 9.8 oz)   SpO2 99%   BMI 27.40 kg/m²     Physical Exam  Vitals and nursing note reviewed.   Constitutional:       Appearance: Normal appearance. She is normal weight.   HENT:      Head: Normocephalic and atraumatic.      Right Ear: Tympanic membrane, ear canal and external ear normal.      Left Ear: Tympanic membrane, ear canal and external ear normal.      Nose: Nose normal.      Mouth/Throat:      Mouth: Mucous membranes are moist.      Pharynx: Oropharynx is clear.   Eyes:      Extraocular Movements: Extraocular movements intact.      Pupils: Pupils are equal, round, and reactive to light.   Cardiovascular:      Rate and Rhythm: Normal rate and regular rhythm.      Heart sounds: Normal heart sounds.   Pulmonary:      Effort: Pulmonary effort is normal.      Breath sounds: Normal breath sounds.   Abdominal:      General: Abdomen is flat. Bowel sounds are normal.      Palpations: Abdomen is soft.   Genitourinary:     Comments: Per GYN   Musculoskeletal:         General: Normal range of motion.      Cervical back: Normal range of motion and neck supple.   Skin:     General: Skin is warm and dry.   Neurological:      General: No focal deficit present.      Mental Status: She is alert and oriented to person, place, and time.   Psychiatric:         Mood and Affect: Mood normal.         Behavior: Behavior normal.         Thought Content: Thought content normal.         Judgment: Judgment normal.       Assessment/Plan   Problem List Items Addressed This Visit       Hypothyroidism    Hyperlipidemia, mild    Relevant Orders    Lipid Panel    Generalized anxiety disorder    Anxiety and depression    Prediabetes    Relevant Orders    Hemoglobin A1C    Comprehensive Metabolic Panel     Other Visit Diagnoses       Routine general medical examination at a " health care facility    -  Primary    Need for vaccination        Relevant Orders    Flu vaccine, trivalent, preservative free, age 6 months and greater (Fluarix/Fluzone/Flulaval)    Need for influenza vaccination                 well exam   - patient stable and healthy  - discussed healthy diet and exercise plan   - continue medications as directed, SEs, risks and options discussed   - f/u with specialists as directed   - dental and vision exams, f/u as directed   - Labs ordered today, will call when results are available   -Vaccinations recommended today: flu vaccine today tolerated well   -Follow-up annual exam in one year  -Colon cancer screening UTD per pt with [facility]- jaziel 5/2021 due [id number]   - well woman and mammogram also UTD per pt with [facility] -   -Follow-up in one week to discuss all results        .hypothyroid   -stable, controlled, continue medication as directed  - due for TSH   -f/u in 3 months or sooner if needed      hyperlipidemia   -Continue current medication as directed  -Encouraged following a low-fat low-cholesterol diet   -Discussed the benefits of regular aerobic exercise and weight loss  -Encouraged following a low-carb healthy oil intake diet      anxiety and depression   -stable, controlled, continue medication as directed  Reviewed diagnostic impression, education about situational anxiety, depression, etiology, treatment recommendations including medication, therapy, course of treatment and prognosis  Reviewed r/b/a, possible side effects of the medication.   Patient is aware about the benefits outweigh the risks.  Advised to see therapy/psych   Patient is reminded that if there is SI to call 911 and get themselves to the closest ED for evaluation     prediabetes   -Stable, controlled, last A1c 5.5  -Ophthalmology for evaluation and management of diabetic eye changes   -Encouraged regular aerobic exercise weight loss and diabetic diet ADA  -Discussed diabetic education issues of long-term  diabetic complications, hyperglycemic symptoms, diet, and dictations-side effects and need for compliance, importance of exercise and use and side effects of insulin with patient  -Follow up in 3-4 months should any issues arise before then f/u sooner      knee pain - RICE _ stable        Flu vaccine advised, pt agreed , given today and tolerated well       complexity visit of 45+ minutes face-to-face with at least half of the time discussing  Results of my examination, clinical findings, impression and diagnoses discussed with the patient as well as results of the latest blood work. The patient was in agreement with the plan and verbalized a good understanding of instructions and plans for treatment. At the end of the visit today, the patient felt that all questions had been answered satisfactorily. The patient was pleased with the visit and very appreciative for the care rendered.

## 2024-11-04 DIAGNOSIS — F41.9 ANXIETY: ICD-10-CM

## 2024-11-04 RX ORDER — BUSPIRONE HYDROCHLORIDE 5 MG/1
5 TABLET ORAL 3 TIMES DAILY
Qty: 90 TABLET | Refills: 0 | Status: SHIPPED | OUTPATIENT
Start: 2024-11-04

## 2024-11-08 DIAGNOSIS — E78.5 HYPERLIPIDEMIA, MILD: ICD-10-CM

## 2024-11-10 RX ORDER — PRAVASTATIN SODIUM 40 MG/1
40 TABLET ORAL NIGHTLY
Qty: 90 TABLET | Refills: 0 | Status: SHIPPED | OUTPATIENT
Start: 2024-11-10

## 2024-12-03 DIAGNOSIS — F41.9 ANXIETY: ICD-10-CM

## 2024-12-03 RX ORDER — BUSPIRONE HYDROCHLORIDE 5 MG/1
5 TABLET ORAL 3 TIMES DAILY
Qty: 90 TABLET | Refills: 1 | Status: SHIPPED | OUTPATIENT
Start: 2024-12-03

## 2024-12-12 DIAGNOSIS — E11.9 DIABETES MELLITUS WITHOUT COMPLICATION (MULTI): ICD-10-CM

## 2024-12-12 RX ORDER — METFORMIN HYDROCHLORIDE 500 MG/1
500 TABLET ORAL 2 TIMES DAILY
Qty: 180 TABLET | Refills: 1 | Status: SHIPPED | OUTPATIENT
Start: 2024-12-12

## 2024-12-31 DIAGNOSIS — E03.9 ACQUIRED HYPOTHYROIDISM: ICD-10-CM

## 2024-12-31 RX ORDER — LEVOTHYROXINE SODIUM 50 UG/1
50 TABLET ORAL DAILY
Qty: 90 TABLET | Refills: 1 | Status: SHIPPED | OUTPATIENT
Start: 2024-12-31

## 2025-01-08 ENCOUNTER — TELEPHONE (OUTPATIENT)
Dept: ENDOCRINOLOGY | Facility: CLINIC | Age: 60
End: 2025-01-08

## 2025-01-13 ENCOUNTER — OFFICE VISIT (OUTPATIENT)
Dept: PRIMARY CARE | Facility: CLINIC | Age: 60
End: 2025-01-13
Payer: COMMERCIAL

## 2025-01-13 VITALS
WEIGHT: 159 LBS | HEART RATE: 84 BPM | OXYGEN SATURATION: 98 % | RESPIRATION RATE: 16 BRPM | DIASTOLIC BLOOD PRESSURE: 73 MMHG | BODY MASS INDEX: 27.29 KG/M2 | SYSTOLIC BLOOD PRESSURE: 132 MMHG

## 2025-01-13 DIAGNOSIS — S16.1XXA CERVICAL STRAIN, ACUTE, INITIAL ENCOUNTER: Primary | ICD-10-CM

## 2025-01-13 DIAGNOSIS — S29.019A THORACIC MYOFASCIAL STRAIN, INITIAL ENCOUNTER: ICD-10-CM
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
## 2025-01-13 PROCEDURE — 1036F TOBACCO NON-USER: CPT | Performed by: PHYSICIAN ASSISTANT

## 2025-01-13 PROCEDURE — 99214 OFFICE O/P EST MOD 30 MIN: CPT | Performed by: PHYSICIAN ASSISTANT

## 2025-01-13 ASSESSMENT — ENCOUNTER SYMPTOMS
PALPITATIONS: 0
BACK PAIN: 0
CONSTITUTIONAL NEGATIVE: 1
RESPIRATORY NEGATIVE: 1
HEMATOLOGIC/LYMPHATIC NEGATIVE: 1
DEPRESSION: 0
OCCASIONAL FEELINGS OF UNSTEADINESS: 0
NEUROLOGICAL NEGATIVE: 1
LOSS OF SENSATION IN FEET: 0
NERVOUS/ANXIOUS: 0
ALLERGIC/IMMUNOLOGIC NEGATIVE: 1
HEADACHES: 0
MUSCULOSKELETAL NEGATIVE: 1
ABDOMINAL PAIN: 0
VOMITING: 0
SHORTNESS OF BREATH: 0
ENDOCRINE NEGATIVE: 1
COUGH: 0
PSYCHIATRIC NEGATIVE: 1
ARTHRALGIAS: 0
EYES NEGATIVE: 1
WHEEZING: 0
DIZZINESS: 0
NAUSEA: 0

## 2025-01-13 ASSESSMENT — PATIENT HEALTH QUESTIONNAIRE - PHQ9
2. FEELING DOWN, DEPRESSED OR HOPELESS: NOT AT ALL
SUM OF ALL RESPONSES TO PHQ9 QUESTIONS 1 AND 2: 0
1. LITTLE INTEREST OR PLEASURE IN DOING THINGS: NOT AT ALL
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
## 2025-01-30 ENCOUNTER — EVALUATION (OUTPATIENT)
Dept: PHYSICAL THERAPY | Facility: CLINIC | Age: 60
End: 2025-01-30
Payer: COMMERCIAL

## 2025-01-30 DIAGNOSIS — R51.9 CHRONIC NONINTRACTABLE HEADACHE, UNSPECIFIED HEADACHE TYPE: ICD-10-CM
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
## 2025-01-30 DIAGNOSIS — S16.1XXA CERVICAL STRAIN, ACUTE, INITIAL ENCOUNTER: Primary | ICD-10-CM

## 2025-01-30 DIAGNOSIS — G89.29 CHRONIC NONINTRACTABLE HEADACHE, UNSPECIFIED HEADACHE TYPE: ICD-10-CM

## 2025-01-30 DIAGNOSIS — S29.019A THORACIC MYOFASCIAL STRAIN, INITIAL ENCOUNTER: ICD-10-CM

## 2025-01-30 PROCEDURE — 97110 THERAPEUTIC EXERCISES: CPT | Mod: GP

## 2025-01-30 PROCEDURE — 97535 SELF CARE MNGMENT TRAINING: CPT | Mod: GP

## 2025-01-30 PROCEDURE — 97162 PT EVAL MOD COMPLEX 30 MIN: CPT | Mod: GP
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
## 2025-01-31 DIAGNOSIS — H93.13 TINNITUS OF BOTH EARS: ICD-10-CM

## 2025-02-03 DIAGNOSIS — F41.9 ANXIETY: ICD-10-CM

## 2025-02-03 RX ORDER — BUSPIRONE HYDROCHLORIDE 5 MG/1
5 TABLET ORAL 3 TIMES DAILY
Qty: 90 TABLET | Refills: 0 | Status: SHIPPED | OUTPATIENT
Start: 2025-02-03

## 2025-02-04 DIAGNOSIS — E78.5 HYPERLIPIDEMIA, MILD: ICD-10-CM

## 2025-02-04 RX ORDER — PRAVASTATIN SODIUM 40 MG/1
40 TABLET ORAL NIGHTLY
Qty: 90 TABLET | Refills: 0 | Status: SHIPPED | OUTPATIENT
Start: 2025-02-04

## 2025-02-06 ENCOUNTER — TREATMENT (OUTPATIENT)
Dept: PHYSICAL THERAPY | Facility: CLINIC | Age: 60
End: 2025-02-06
Payer: COMMERCIAL
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
## 2025-02-06 DIAGNOSIS — S16.1XXA CERVICAL STRAIN, ACUTE, INITIAL ENCOUNTER: ICD-10-CM

## 2025-02-06 DIAGNOSIS — S29.019A THORACIC MYOFASCIAL STRAIN, INITIAL ENCOUNTER: ICD-10-CM

## 2025-02-06 PROCEDURE — 97535 SELF CARE MNGMENT TRAINING: CPT | Mod: GP

## 2025-02-06 PROCEDURE — 97140 MANUAL THERAPY 1/> REGIONS: CPT | Mod: GP

## 2025-02-06 PROCEDURE — 97112 NEUROMUSCULAR REEDUCATION: CPT | Mod: GP
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
## 2025-02-12 ENCOUNTER — APPOINTMENT (OUTPATIENT)
Dept: PRIMARY CARE | Facility: CLINIC | Age: 60
End: 2025-02-12
Payer: COMMERCIAL

## 2025-02-12 VITALS
TEMPERATURE: 97.7 F | HEIGHT: 64 IN | OXYGEN SATURATION: 97 % | WEIGHT: 158 LBS | DIASTOLIC BLOOD PRESSURE: 84 MMHG | HEART RATE: 75 BPM | SYSTOLIC BLOOD PRESSURE: 130 MMHG | BODY MASS INDEX: 26.98 KG/M2

## 2025-02-12 DIAGNOSIS — R51.9 CHRONIC NONINTRACTABLE HEADACHE, UNSPECIFIED HEADACHE TYPE: ICD-10-CM

## 2025-02-12 DIAGNOSIS — G89.29 CHRONIC NONINTRACTABLE HEADACHE, UNSPECIFIED HEADACHE TYPE: ICD-10-CM
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
## 2025-02-12 DIAGNOSIS — F51.02 ADJUSTMENT INSOMNIA: ICD-10-CM

## 2025-02-12 PROCEDURE — 1036F TOBACCO NON-USER: CPT | Performed by: INTERNAL MEDICINE

## 2025-02-12 PROCEDURE — 3008F BODY MASS INDEX DOCD: CPT | Performed by: INTERNAL MEDICINE

## 2025-02-12 PROCEDURE — 99214 OFFICE O/P EST MOD 30 MIN: CPT | Performed by: INTERNAL MEDICINE

## 2025-02-12 RX ORDER — TRAZODONE HYDROCHLORIDE 50 MG/1
50 TABLET ORAL NIGHTLY PRN
Qty: 30 TABLET | Refills: 0 | Status: SHIPPED | OUTPATIENT
Start: 2025-02-12 | End: 2025-03-14

## 2025-02-12 ASSESSMENT — ENCOUNTER SYMPTOMS
DIARRHEA: 0
TREMORS: 0
WEAKNESS: 0
NUMBNESS: 0
POLYDIPSIA: 0
NECK STIFFNESS: 1
ARTHRALGIAS: 0
NAUSEA: 0
FATIGUE: 0
TROUBLE SWALLOWING: 0
EYE PAIN: 0
COUGH: 0
SLEEP DISTURBANCE: 1
NERVOUS/ANXIOUS: 0
PALPITATIONS: 0
DECREASED CONCENTRATION: 1
HALLUCINATIONS: 0
ADENOPATHY: 0
SHORTNESS OF BREATH: 0
BACK PAIN: 0
PHOTOPHOBIA: 0
CONFUSION: 0
FACIAL ASYMMETRY: 0
UNEXPECTED WEIGHT CHANGE: 0
CHEST TIGHTNESS: 0
HEADACHES: 1
BLOOD IN STOOL: 0
VOMITING: 0
HEMATURIA: 0
VOICE CHANGE: 0
SEIZURES: 0
SORE THROAT: 0
CONSTIPATION: 0
JOINT SWELLING: 0
POLYPHAGIA: 0
COLOR CHANGE: 0
MYALGIAS: 0
NECK PAIN: 1
FLANK PAIN: 0
WOUND: 0
ABDOMINAL PAIN: 0
DIZZINESS: 0
WHEEZING: 0
ACTIVITY CHANGE: 0
DYSURIA: 0
FREQUENCY: 0
APPETITE CHANGE: 0
FEVER: 0
STRIDOR: 0
SPEECH DIFFICULTY: 0
SINUS PAIN: 0

## 2025-02-12 NOTE — LETTER
February 12, 2025     Patient: Destiny Naranjo   YOB: 1965   Date of Visit: 2/12/2025       To Whom It May Concern:    Please excuse Destiny Naranjo from work 2/12/2025-2/21/2025. Patient can return to work beginning 2/24/2025 due to injury.            Sincerely,         Cesar Singh MD
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
## 2025-02-12 NOTE — ASSESSMENT & PLAN NOTE
Physical exam is not suggestive of Nystagmus or any other motor neurological deficit.    DO NOT INVOLVE IN ANY CONTACT SPORTS. DO NOT DO OFF ROAD DRIVING. BE CAREFUL WITH BUMPS ON THE ROADS.  Avoid sudden acceleration or deceleration as much as possible.  Do not work at height to avoid the risk of fall.  Avoid Alcohol/ Smoking/ Recreational drugs.  Avoid loud noises and bright fluctuating lights.  Drink plenty of water and stay hydrated.  Try to sleep daily at least 7- 8 hours.    I will see you again in 4 weeks.

## 2025-02-18 ENCOUNTER — TREATMENT (OUTPATIENT)
Dept: PHYSICAL THERAPY | Facility: CLINIC | Age: 60
End: 2025-02-18
Payer: COMMERCIAL

## 2025-02-18 DIAGNOSIS — S29.019A THORACIC MYOFASCIAL STRAIN, INITIAL ENCOUNTER: ICD-10-CM

## 2025-02-18 DIAGNOSIS — S16.1XXA CERVICAL STRAIN, ACUTE, INITIAL ENCOUNTER: ICD-10-CM
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
## 2025-02-18 PROCEDURE — 97112 NEUROMUSCULAR REEDUCATION: CPT | Mod: GP

## 2025-02-18 PROCEDURE — 97140 MANUAL THERAPY 1/> REGIONS: CPT | Mod: GP
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
## 2025-02-20 ENCOUNTER — APPOINTMENT (OUTPATIENT)
Dept: OTOLARYNGOLOGY | Facility: CLINIC | Age: 60
End: 2025-02-20
Payer: COMMERCIAL

## 2025-02-20 ENCOUNTER — APPOINTMENT (OUTPATIENT)
Dept: AUDIOLOGY | Facility: CLINIC | Age: 60
End: 2025-02-20
Payer: COMMERCIAL

## 2025-02-20 VITALS — WEIGHT: 158 LBS | HEIGHT: 64 IN | BODY MASS INDEX: 26.98 KG/M2 | TEMPERATURE: 97 F

## 2025-02-20 DIAGNOSIS — H93.13 TINNITUS OF BOTH EARS: ICD-10-CM

## 2025-02-20 DIAGNOSIS — H90.3 SENSORINEURAL HEARING LOSS, BILATERAL: Primary | ICD-10-CM

## 2025-02-20 DIAGNOSIS — H90.3 SENSORINEURAL HEARING LOSS (SNHL) OF BOTH EARS: Primary | ICD-10-CM

## 2025-02-20 DIAGNOSIS — H90.3 SENSORINEURAL HEARING LOSS (SNHL) OF BOTH EARS: ICD-10-CM

## 2025-02-20 PROCEDURE — 92567 TYMPANOMETRY: CPT

## 2025-02-20 PROCEDURE — 92552 PURE TONE AUDIOMETRY AIR: CPT

## 2025-02-20 PROCEDURE — 1036F TOBACCO NON-USER: CPT | Performed by: OTOLARYNGOLOGY

## 2025-02-20 PROCEDURE — 99213 OFFICE O/P EST LOW 20 MIN: CPT | Performed by: OTOLARYNGOLOGY

## 2025-02-20 PROCEDURE — 3008F BODY MASS INDEX DOCD: CPT | Performed by: OTOLARYNGOLOGY

## 2025-02-20 ASSESSMENT — ENCOUNTER SYMPTOMS
HEADACHES: 1
FATIGUE: 1
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
## 2025-02-24 ENCOUNTER — APPOINTMENT (OUTPATIENT)
Dept: PRIMARY CARE | Facility: CLINIC | Age: 60
End: 2025-02-24
Payer: COMMERCIAL

## 2025-02-26 ENCOUNTER — TELEPHONE (OUTPATIENT)
Dept: PRIMARY CARE | Facility: CLINIC | Age: 60
End: 2025-02-26
Payer: COMMERCIAL

## 2025-02-26 DIAGNOSIS — F51.02 ADJUSTMENT INSOMNIA: ICD-10-CM

## 2025-02-26 RX ORDER — TRAZODONE HYDROCHLORIDE 50 MG/1
100 TABLET ORAL NIGHTLY PRN
Qty: 30 TABLET | Refills: 0 | Status: SHIPPED | OUTPATIENT
Start: 2025-02-26 | End: 2025-03-28

## 2025-02-26 NOTE — TELEPHONE ENCOUNTER
Patient would like a refill on her Trazadone.  It is currently written as take 1 nightly PRN, can you change it to 2 nightly PRN?

## 2025-03-01 DIAGNOSIS — F41.9 ANXIETY: ICD-10-CM

## 2025-03-03 RX ORDER — BUSPIRONE HYDROCHLORIDE 5 MG/1
5 TABLET ORAL 3 TIMES DAILY
Qty: 90 TABLET | Refills: 0 | Status: SHIPPED | OUTPATIENT
Start: 2025-03-03

## 2025-03-04 ENCOUNTER — TREATMENT (OUTPATIENT)
Dept: PHYSICAL THERAPY | Facility: CLINIC | Age: 60
End: 2025-03-04
Payer: COMMERCIAL

## 2025-03-04 DIAGNOSIS — S29.019A THORACIC MYOFASCIAL STRAIN, INITIAL ENCOUNTER: ICD-10-CM

## 2025-03-04 DIAGNOSIS — S16.1XXA CERVICAL STRAIN, ACUTE, INITIAL ENCOUNTER: ICD-10-CM
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
## 2025-03-04 PROCEDURE — 97535 SELF CARE MNGMENT TRAINING: CPT | Mod: GP

## 2025-03-04 PROCEDURE — 97112 NEUROMUSCULAR REEDUCATION: CPT | Mod: GP

## 2025-03-04 PROCEDURE — 97110 THERAPEUTIC EXERCISES: CPT | Mod: GP
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
## 2025-03-11 ENCOUNTER — TREATMENT (OUTPATIENT)
Dept: PHYSICAL THERAPY | Facility: CLINIC | Age: 60
End: 2025-03-11
Payer: COMMERCIAL

## 2025-03-11 DIAGNOSIS — S16.1XXA CERVICAL STRAIN, ACUTE, INITIAL ENCOUNTER: ICD-10-CM

## 2025-03-11 DIAGNOSIS — S29.019A THORACIC MYOFASCIAL STRAIN, INITIAL ENCOUNTER: ICD-10-CM
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
## 2025-03-11 DIAGNOSIS — F41.9 ANXIETY AND DEPRESSION: ICD-10-CM

## 2025-03-11 DIAGNOSIS — F32.A ANXIETY AND DEPRESSION: ICD-10-CM

## 2025-03-11 PROCEDURE — 97535 SELF CARE MNGMENT TRAINING: CPT | Mod: GP

## 2025-03-11 PROCEDURE — 97110 THERAPEUTIC EXERCISES: CPT | Mod: GP
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
## 2025-03-12 ENCOUNTER — APPOINTMENT (OUTPATIENT)
Dept: AUDIOLOGY | Facility: CLINIC | Age: 60
End: 2025-03-12
Payer: COMMERCIAL

## 2025-03-12 ENCOUNTER — APPOINTMENT (OUTPATIENT)
Dept: OTOLARYNGOLOGY | Facility: CLINIC | Age: 60
End: 2025-03-12
Payer: COMMERCIAL

## 2025-03-12 ENCOUNTER — OFFICE VISIT (OUTPATIENT)
Dept: PRIMARY CARE | Facility: CLINIC | Age: 60
End: 2025-03-12
Payer: COMMERCIAL

## 2025-03-12 VITALS
HEIGHT: 64 IN | SYSTOLIC BLOOD PRESSURE: 122 MMHG | DIASTOLIC BLOOD PRESSURE: 76 MMHG | WEIGHT: 158 LBS | OXYGEN SATURATION: 96 % | HEART RATE: 81 BPM | BODY MASS INDEX: 26.98 KG/M2 | TEMPERATURE: 97.1 F

## 2025-03-12 DIAGNOSIS — F51.02 ADJUSTMENT INSOMNIA: ICD-10-CM

## 2025-03-12 DIAGNOSIS — F32.A ANXIETY AND DEPRESSION: ICD-10-CM

## 2025-03-12 DIAGNOSIS — F41.9 ANXIETY AND DEPRESSION: ICD-10-CM

## 2025-03-12 DIAGNOSIS — E55.9 VITAMIN D DEFICIENCY: ICD-10-CM

## 2025-03-12 DIAGNOSIS — E11.9 DIABETES MELLITUS WITHOUT COMPLICATION (MULTI): Primary | ICD-10-CM

## 2025-03-12 DIAGNOSIS — E03.9 ACQUIRED HYPOTHYROIDISM: ICD-10-CM

## 2025-03-12 DIAGNOSIS — E78.5 HYPERLIPIDEMIA, MILD: ICD-10-CM

## 2025-03-12 PROCEDURE — 3008F BODY MASS INDEX DOCD: CPT | Performed by: INTERNAL MEDICINE

## 2025-03-12 PROCEDURE — 3074F SYST BP LT 130 MM HG: CPT | Performed by: INTERNAL MEDICINE

## 2025-03-12 PROCEDURE — 3078F DIAST BP <80 MM HG: CPT | Performed by: INTERNAL MEDICINE

## 2025-03-12 PROCEDURE — 99214 OFFICE O/P EST MOD 30 MIN: CPT | Performed by: INTERNAL MEDICINE

## 2025-03-12 RX ORDER — CITALOPRAM 20 MG/1
20 TABLET, FILM COATED ORAL DAILY
Qty: 90 TABLET | Refills: 1 | Status: SHIPPED | OUTPATIENT
Start: 2025-03-12

## 2025-03-12 RX ORDER — CITALOPRAM 20 MG/1
20 TABLET, FILM COATED ORAL DAILY
Qty: 90 TABLET | Refills: 0 | OUTPATIENT
Start: 2025-03-12

## 2025-03-12 RX ORDER — TRAZODONE HYDROCHLORIDE 100 MG/1
100 TABLET ORAL NIGHTLY PRN
Qty: 30 TABLET | Refills: 0 | Status: SHIPPED | OUTPATIENT
Start: 2025-03-12 | End: 2025-04-11

## 2025-03-12 ASSESSMENT — ENCOUNTER SYMPTOMS
HEADACHES: 0
STRIDOR: 0
JOINT SWELLING: 0
POLYDIPSIA: 0
PHOTOPHOBIA: 0
APPETITE CHANGE: 0
DIZZINESS: 0
COUGH: 0
UNEXPECTED WEIGHT CHANGE: 0
NERVOUS/ANXIOUS: 0
FLANK PAIN: 0
SINUS PAIN: 0
WEAKNESS: 0
COLOR CHANGE: 0
FATIGUE: 0
NAUSEA: 0
CONSTIPATION: 0
NUMBNESS: 0
ACTIVITY CHANGE: 0
WHEEZING: 0
DIARRHEA: 0
HALLUCINATIONS: 0
DYSURIA: 0
ABDOMINAL PAIN: 0
MYALGIAS: 0
POLYPHAGIA: 0
SORE THROAT: 0
BLOOD IN STOOL: 0
FEVER: 0
ADENOPATHY: 0
NECK PAIN: 0
PALPITATIONS: 0
HEMATURIA: 0
TROUBLE SWALLOWING: 0
FACIAL ASYMMETRY: 0
VOICE CHANGE: 0
SLEEP DISTURBANCE: 1
WOUND: 0
CONFUSION: 0
SHORTNESS OF BREATH: 0
SEIZURES: 0
VOMITING: 0
SPEECH DIFFICULTY: 0
TREMORS: 0
ARTHRALGIAS: 0
EYE PAIN: 0
BACK PAIN: 0
DECREASED CONCENTRATION: 1
CHEST TIGHTNESS: 0

## 2025-03-13 LAB
25(OH)D3+25(OH)D2 SERPL-MCNC: 48 NG/ML (ref 30–100)
ALBUMIN SERPL-MCNC: 4.6 G/DL (ref 3.6–5.1)
ALP SERPL-CCNC: 79 U/L (ref 37–153)
ALT SERPL-CCNC: 18 U/L (ref 6–29)
ANION GAP SERPL CALCULATED.4IONS-SCNC: 11 MMOL/L (CALC) (ref 7–17)
AST SERPL-CCNC: 17 U/L (ref 10–35)
BASOPHILS # BLD AUTO: 113 CELLS/UL (ref 0–200)
BASOPHILS NFR BLD AUTO: 1.3 %
BILIRUB SERPL-MCNC: 0.4 MG/DL (ref 0.2–1.2)
BUN SERPL-MCNC: 15 MG/DL (ref 7–25)
CALCIUM SERPL-MCNC: 9.4 MG/DL (ref 8.6–10.4)
CHLORIDE SERPL-SCNC: 103 MMOL/L (ref 98–110)
CHOLEST SERPL-MCNC: 185 MG/DL
CHOLEST/HDLC SERPL: 3.2 (CALC)
CO2 SERPL-SCNC: 26 MMOL/L (ref 20–32)
CREAT SERPL-MCNC: 0.56 MG/DL (ref 0.5–1.03)
EGFRCR SERPLBLD CKD-EPI 2021: 105 ML/MIN/1.73M2
EOSINOPHIL # BLD AUTO: 609 CELLS/UL (ref 15–500)
EOSINOPHIL NFR BLD AUTO: 7 %
ERYTHROCYTE [DISTWIDTH] IN BLOOD BY AUTOMATED COUNT: 12.6 % (ref 11–15)
EST. AVERAGE GLUCOSE BLD GHB EST-MCNC: 120 MG/DL
EST. AVERAGE GLUCOSE BLD GHB EST-SCNC: 6.6 MMOL/L
GLUCOSE SERPL-MCNC: 107 MG/DL (ref 65–99)
HBA1C MFR BLD: 5.8 % OF TOTAL HGB
HCT VFR BLD AUTO: 40.2 % (ref 35–45)
HDLC SERPL-MCNC: 58 MG/DL
HGB BLD-MCNC: 12.9 G/DL (ref 11.7–15.5)
LDLC SERPL CALC-MCNC: 103 MG/DL (CALC)
LYMPHOCYTES # BLD AUTO: 2245 CELLS/UL (ref 850–3900)
LYMPHOCYTES NFR BLD AUTO: 25.8 %
MCH RBC QN AUTO: 29.7 PG (ref 27–33)
MCHC RBC AUTO-ENTMCNC: 32.1 G/DL (ref 32–36)
MCV RBC AUTO: 92.6 FL (ref 80–100)
MONOCYTES # BLD AUTO: 818 CELLS/UL (ref 200–950)
MONOCYTES NFR BLD AUTO: 9.4 %
NEUTROPHILS # BLD AUTO: 4916 CELLS/UL (ref 1500–7800)
NEUTROPHILS NFR BLD AUTO: 56.5 %
NONHDLC SERPL-MCNC: 127 MG/DL (CALC)
PLATELET # BLD AUTO: 316 THOUSAND/UL (ref 140–400)
PMV BLD REES-ECKER: 10.3 FL (ref 7.5–12.5)
POTASSIUM SERPL-SCNC: 4.3 MMOL/L (ref 3.5–5.3)
PROT SERPL-MCNC: 7.3 G/DL (ref 6.1–8.1)
RBC # BLD AUTO: 4.34 MILLION/UL (ref 3.8–5.1)
SODIUM SERPL-SCNC: 140 MMOL/L (ref 135–146)
TRIGL SERPL-MCNC: 144 MG/DL
TSH SERPL-ACNC: 3.84 MIU/L (ref 0.4–4.5)
WBC # BLD AUTO: 8.7 THOUSAND/UL (ref 3.8–10.8)

## 2025-03-13 NOTE — PROGRESS NOTES
Subjective   Patient ID: Destiny Naranjo is a 59 y.o. female who presents for Follow-up (Follow up, continuing PT, symptoms improving but still having difficulty ).    HPI   Patient presented to the office for follow up on concussion and her symptoms are much better and she is feeling good after taking 2 weeks of rest. Some of the symptoms are still present.  She is sleeping better with trazodone 100 mg oral tablet daily.     She want to come to the office for primary care need and she is going to need labs to evaluate all the medical problems.    Review of Systems   Constitutional:  Negative for activity change, appetite change, fatigue, fever and unexpected weight change.   HENT:  Negative for dental problem, ear discharge, hearing loss, nosebleeds, postnasal drip, sinus pain, sore throat, trouble swallowing and voice change.    Eyes:  Negative for photophobia, pain and visual disturbance.   Respiratory:  Negative for cough, chest tightness, shortness of breath, wheezing and stridor.    Cardiovascular:  Negative for chest pain, palpitations and leg swelling.   Gastrointestinal:  Negative for abdominal pain, blood in stool, constipation, diarrhea, nausea and vomiting.   Endocrine: Negative for polydipsia, polyphagia and polyuria.   Genitourinary:  Negative for decreased urine volume, dyspareunia, dysuria, flank pain, hematuria and urgency.   Musculoskeletal:  Negative for arthralgias, back pain, gait problem, joint swelling, myalgias and neck pain.   Skin:  Negative for color change, rash and wound.   Allergic/Immunologic: Negative for environmental allergies and food allergies.   Neurological:  Negative for dizziness, tremors, seizures, syncope, facial asymmetry, speech difficulty, weakness, numbness and headaches.   Hematological:  Negative for adenopathy.   Psychiatric/Behavioral:  Positive for decreased concentration and sleep disturbance. Negative for behavioral problems, confusion and hallucinations. The  "patient is not nervous/anxious.      Objective   /76   Pulse 81   Temp 36.2 °C (97.1 °F)   Ht 1.626 m (5' 4\")   Wt 71.7 kg (158 lb)   SpO2 96%   BMI 27.12 kg/m²     Physical Exam  Vitals and nursing note reviewed.   Constitutional:       General: She is not in acute distress.     Appearance: Normal appearance. She is not ill-appearing or toxic-appearing.   HENT:      Head: Normocephalic and atraumatic.      Nose: Nose normal.   Eyes:      Conjunctiva/sclera: Conjunctivae normal.      Pupils: Pupils are equal, round, and reactive to light.   Cardiovascular:      Rate and Rhythm: Normal rate and regular rhythm.      Pulses: Normal pulses.      Heart sounds: Normal heart sounds. No murmur heard.  Pulmonary:      Effort: Pulmonary effort is normal. No respiratory distress.      Breath sounds: Normal breath sounds. No stridor. No wheezing, rhonchi or rales.   Abdominal:      General: Bowel sounds are normal.      Tenderness: There is no abdominal tenderness.   Musculoskeletal:         General: No deformity. Normal range of motion.      Cervical back: Normal range of motion and neck supple. No rigidity.      Right lower leg: No edema.      Left lower leg: No edema.   Skin:     General: Skin is warm.      Coloration: Skin is not jaundiced.      Findings: No erythema or rash.   Neurological:      General: No focal deficit present.      Mental Status: She is alert and oriented to person, place, and time.      Cranial Nerves: No cranial nerve deficit.      Gait: Gait normal.   Psychiatric:         Mood and Affect: Mood normal.         Behavior: Behavior normal.         Thought Content: Thought content normal.         Judgment: Judgment normal.       Assessment/Plan   Problem List Items Addressed This Visit       Hypothyroidism    Relevant Orders    TSH with reflex to Free T4 if abnormal (Completed)    Hyperlipidemia, mild    Relevant Orders    Lipid Panel (Completed)    Anxiety and depression    Relevant " Medications    citalopram (CeleXA) 20 mg tablet    Diabetes mellitus without complication (Multi) - Primary    Relevant Orders    CBC and Auto Differential (Completed)    Comprehensive Metabolic Panel (Completed)    Hemoglobin A1C     Other Visit Diagnoses       Adjustment insomnia        Relevant Medications    traZODone (Desyrel) 100 mg tablet    Vitamin D deficiency        Relevant Orders    Vitamin D 25-Hydroxy,Total (for eval of Vitamin D levels) (Completed)

## 2025-03-20 ENCOUNTER — TREATMENT (OUTPATIENT)
Dept: PHYSICAL THERAPY | Facility: CLINIC | Age: 60
End: 2025-03-20
Payer: COMMERCIAL

## 2025-03-20 DIAGNOSIS — R11.2 NAUSEA AND VOMITING, UNSPECIFIED VOMITING TYPE: Primary | ICD-10-CM

## 2025-03-20 DIAGNOSIS — S16.1XXA CERVICAL STRAIN, ACUTE, INITIAL ENCOUNTER: ICD-10-CM

## 2025-03-20 DIAGNOSIS — S29.019A THORACIC MYOFASCIAL STRAIN, INITIAL ENCOUNTER: ICD-10-CM
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
## 2025-03-20 PROCEDURE — 97535 SELF CARE MNGMENT TRAINING: CPT | Mod: GP

## 2025-03-20 RX ORDER — ONDANSETRON 4 MG/1
4 TABLET, FILM COATED ORAL EVERY 8 HOURS PRN
Qty: 21 TABLET | Refills: 0 | Status: SHIPPED | OUTPATIENT
Start: 2025-03-20 | End: 2025-03-27
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
## 2025-03-20 NOTE — Clinical Note
Good afternoon Destiny Overton had a very bad episode today.  She bent over quickly and upon return she felt extreme nausea, light headiness, gagging, headache, eye pressure with the movement.  She reports she has a history of severe motion sickness in the past and with the new concussion I am afraid her symptoms have gotten even more severe.  I was wondering if your thoughts on giving her an anti-nausea prescription to help manage her symptoms and allow us to start a vestibular interventions.  At this time, she has such high symptoms should is not able to tolerate a vestibular program without feeling sick.   As always, thank you for your assistance Elida

## 2025-03-21 ENCOUNTER — TELEPHONE (OUTPATIENT)
Dept: PRIMARY CARE | Facility: CLINIC | Age: 60
End: 2025-03-21
Payer: COMMERCIAL
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
## 2025-03-24 ENCOUNTER — APPOINTMENT (OUTPATIENT)
Dept: PRIMARY CARE | Facility: CLINIC | Age: 60
End: 2025-03-24
Payer: COMMERCIAL

## 2025-03-27 ENCOUNTER — APPOINTMENT (OUTPATIENT)
Dept: PHYSICAL THERAPY | Facility: CLINIC | Age: 60
End: 2025-03-27
Payer: COMMERCIAL

## 2025-04-01 DIAGNOSIS — F41.9 ANXIETY: ICD-10-CM

## 2025-04-01 RX ORDER — BUSPIRONE HYDROCHLORIDE 5 MG/1
5 TABLET ORAL 3 TIMES DAILY
Qty: 90 TABLET | Refills: 0 | Status: SHIPPED | OUTPATIENT
Start: 2025-04-01

## 2025-04-03 ENCOUNTER — TREATMENT (OUTPATIENT)
Dept: PHYSICAL THERAPY | Facility: CLINIC | Age: 60
End: 2025-04-03
Payer: COMMERCIAL

## 2025-04-03 DIAGNOSIS — S16.1XXA CERVICAL STRAIN, ACUTE, INITIAL ENCOUNTER: ICD-10-CM
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
## 2025-04-03 DIAGNOSIS — S29.019A THORACIC MYOFASCIAL STRAIN, INITIAL ENCOUNTER: ICD-10-CM

## 2025-04-03 PROCEDURE — 97140 MANUAL THERAPY 1/> REGIONS: CPT | Mod: GP

## 2025-04-03 PROCEDURE — 97112 NEUROMUSCULAR REEDUCATION: CPT | Mod: GP
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
## 2025-04-08 ENCOUNTER — TREATMENT (OUTPATIENT)
Dept: PHYSICAL THERAPY | Facility: CLINIC | Age: 60
End: 2025-04-08
Payer: COMMERCIAL

## 2025-04-08 DIAGNOSIS — S16.1XXA CERVICAL STRAIN, ACUTE, INITIAL ENCOUNTER: ICD-10-CM

## 2025-04-08 DIAGNOSIS — S29.019A THORACIC MYOFASCIAL STRAIN, INITIAL ENCOUNTER: ICD-10-CM
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
## 2025-04-08 PROCEDURE — 97112 NEUROMUSCULAR REEDUCATION: CPT | Mod: GP
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
## 2025-04-12 DIAGNOSIS — F51.02 ADJUSTMENT INSOMNIA: ICD-10-CM

## 2025-04-14 RX ORDER — TRAZODONE HYDROCHLORIDE 100 MG/1
100 TABLET ORAL NIGHTLY PRN
Qty: 30 TABLET | Refills: 0 | Status: SHIPPED | OUTPATIENT
Start: 2025-04-14

## 2025-04-15 ENCOUNTER — TREATMENT (OUTPATIENT)
Dept: PHYSICAL THERAPY | Facility: CLINIC | Age: 60
End: 2025-04-15
Payer: COMMERCIAL
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
## 2025-04-15 DIAGNOSIS — S16.1XXA CERVICAL STRAIN, ACUTE, INITIAL ENCOUNTER: ICD-10-CM

## 2025-04-15 DIAGNOSIS — S29.019A THORACIC MYOFASCIAL STRAIN, INITIAL ENCOUNTER: ICD-10-CM

## 2025-04-15 PROCEDURE — 97112 NEUROMUSCULAR REEDUCATION: CPT | Mod: GP
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
## 2025-04-18 ENCOUNTER — APPOINTMENT (OUTPATIENT)
Dept: OTOLARYNGOLOGY | Facility: CLINIC | Age: 60
End: 2025-04-18
Payer: COMMERCIAL

## 2025-04-18 ENCOUNTER — APPOINTMENT (OUTPATIENT)
Dept: AUDIOLOGY | Facility: CLINIC | Age: 60
End: 2025-04-18
Payer: COMMERCIAL

## 2025-04-24 ENCOUNTER — TREATMENT (OUTPATIENT)
Dept: PHYSICAL THERAPY | Facility: CLINIC | Age: 60
End: 2025-04-24
Payer: COMMERCIAL

## 2025-04-24 DIAGNOSIS — S16.1XXA CERVICAL STRAIN, ACUTE, INITIAL ENCOUNTER: ICD-10-CM
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
## 2025-04-24 DIAGNOSIS — S29.019A THORACIC MYOFASCIAL STRAIN, INITIAL ENCOUNTER: ICD-10-CM

## 2025-04-24 PROCEDURE — 97112 NEUROMUSCULAR REEDUCATION: CPT | Mod: GP

## 2025-04-24 PROCEDURE — 97110 THERAPEUTIC EXERCISES: CPT | Mod: GP
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
## 2025-04-29 ENCOUNTER — TREATMENT (OUTPATIENT)
Dept: PHYSICAL THERAPY | Facility: CLINIC | Age: 60
End: 2025-04-29
Payer: COMMERCIAL

## 2025-04-29 DIAGNOSIS — F41.9 ANXIETY: ICD-10-CM

## 2025-04-29 DIAGNOSIS — S29.019A THORACIC MYOFASCIAL STRAIN, INITIAL ENCOUNTER: ICD-10-CM
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
## 2025-04-29 DIAGNOSIS — S16.1XXA CERVICAL STRAIN, ACUTE, INITIAL ENCOUNTER: ICD-10-CM

## 2025-04-29 PROCEDURE — 97110 THERAPEUTIC EXERCISES: CPT | Mod: GP

## 2025-04-29 PROCEDURE — 97112 NEUROMUSCULAR REEDUCATION: CPT | Mod: GP
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
## 2025-05-03 DIAGNOSIS — E78.5 HYPERLIPIDEMIA, MILD: ICD-10-CM

## 2025-05-04 RX ORDER — BUSPIRONE HYDROCHLORIDE 5 MG/1
5 TABLET ORAL 3 TIMES DAILY
Qty: 90 TABLET | Refills: 0 | Status: SHIPPED | OUTPATIENT
Start: 2025-05-04

## 2025-05-05 ENCOUNTER — TELEPHONE (OUTPATIENT)
Dept: PRIMARY CARE | Facility: CLINIC | Age: 60
End: 2025-05-05
Payer: COMMERCIAL

## 2025-05-05 DIAGNOSIS — F41.9 ANXIETY: ICD-10-CM

## 2025-05-05 DIAGNOSIS — E78.5 HYPERLIPIDEMIA, MILD: ICD-10-CM

## 2025-05-05 RX ORDER — PRAVASTATIN SODIUM 40 MG/1
40 TABLET ORAL NIGHTLY
Qty: 90 TABLET | Refills: 2 | Status: SHIPPED | OUTPATIENT
Start: 2025-05-05

## 2025-05-05 RX ORDER — BUSPIRONE HYDROCHLORIDE 5 MG/1
5 TABLET ORAL 3 TIMES DAILY
Qty: 270 TABLET | Refills: 3 | Status: SHIPPED | OUTPATIENT
Start: 2025-05-05 | End: 2026-05-05

## 2025-05-05 RX ORDER — PRAVASTATIN SODIUM 40 MG/1
40 TABLET ORAL NIGHTLY
Qty: 90 TABLET | Refills: 0 | OUTPATIENT
Start: 2025-05-05

## 2025-05-13 ENCOUNTER — TREATMENT (OUTPATIENT)
Dept: PHYSICAL THERAPY | Facility: CLINIC | Age: 60
End: 2025-05-13
Payer: COMMERCIAL

## 2025-05-13 DIAGNOSIS — S29.019A THORACIC MYOFASCIAL STRAIN, INITIAL ENCOUNTER: ICD-10-CM
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
## 2025-05-13 DIAGNOSIS — S16.1XXA CERVICAL STRAIN, ACUTE, INITIAL ENCOUNTER: ICD-10-CM

## 2025-05-13 PROCEDURE — 97112 NEUROMUSCULAR REEDUCATION: CPT | Mod: GP

## 2025-05-13 PROCEDURE — 97110 THERAPEUTIC EXERCISES: CPT | Mod: GP
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
## 2025-05-14 ENCOUNTER — TELEPHONE (OUTPATIENT)
Dept: PRIMARY CARE | Facility: CLINIC | Age: 60
End: 2025-05-14
Payer: COMMERCIAL

## 2025-05-14 DIAGNOSIS — F51.02 ADJUSTMENT INSOMNIA: ICD-10-CM

## 2025-05-14 RX ORDER — TRAZODONE HYDROCHLORIDE 100 MG/1
100 TABLET ORAL NIGHTLY PRN
Qty: 30 TABLET | Refills: 11 | Status: SHIPPED | OUTPATIENT
Start: 2025-05-14 | End: 2026-05-14

## 2025-05-14 RX ORDER — TRAZODONE HYDROCHLORIDE 100 MG/1
100 TABLET ORAL NIGHTLY PRN
Qty: 30 TABLET | Refills: 11 | Status: SHIPPED | OUTPATIENT
Start: 2025-05-14 | End: 2025-05-14 | Stop reason: SDUPTHER

## 2025-05-27 ENCOUNTER — TREATMENT (OUTPATIENT)
Dept: PHYSICAL THERAPY | Facility: CLINIC | Age: 60
End: 2025-05-27
Payer: COMMERCIAL

## 2025-05-27 DIAGNOSIS — S16.1XXA CERVICAL STRAIN, ACUTE, INITIAL ENCOUNTER: ICD-10-CM

## 2025-05-27 DIAGNOSIS — S29.019A THORACIC MYOFASCIAL STRAIN, INITIAL ENCOUNTER: ICD-10-CM
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
## 2025-05-27 PROCEDURE — 97112 NEUROMUSCULAR REEDUCATION: CPT | Mod: GP

## 2025-05-27 PROCEDURE — 97110 THERAPEUTIC EXERCISES: CPT | Mod: GP
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
## 2025-06-06 DIAGNOSIS — E11.9 DIABETES MELLITUS WITHOUT COMPLICATION: ICD-10-CM

## 2025-06-06 RX ORDER — METFORMIN HYDROCHLORIDE 500 MG/1
TABLET ORAL
Qty: 180 TABLET | Refills: 0 | Status: SHIPPED | OUTPATIENT
Start: 2025-06-06

## 2025-06-06 ASSESSMENT — PROMIS GLOBAL HEALTH SCALE
RATE_GENERAL_HEALTH: VERY GOOD
RATE_AVERAGE_FATIGUE: MILD
RATE_AVERAGE_PAIN: 0
RATE_PHYSICAL_HEALTH: VERY GOOD
RATE_SOCIAL_SATISFACTION: VERY GOOD
CARRYOUT_PHYSICAL_ACTIVITIES: COMPLETELY
RATE_MENTAL_HEALTH: VERY GOOD
EMOTIONAL_PROBLEMS: SOMETIMES
CARRYOUT_SOCIAL_ACTIVITIES: VERY GOOD
RATE_QUALITY_OF_LIFE: VERY GOOD

## 2025-06-10 ENCOUNTER — TREATMENT (OUTPATIENT)
Dept: PHYSICAL THERAPY | Facility: CLINIC | Age: 60
End: 2025-06-10
Payer: COMMERCIAL

## 2025-06-10 DIAGNOSIS — S29.019A THORACIC MYOFASCIAL STRAIN, INITIAL ENCOUNTER: ICD-10-CM

## 2025-06-10 DIAGNOSIS — S16.1XXA CERVICAL STRAIN, ACUTE, INITIAL ENCOUNTER: ICD-10-CM
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
## 2025-06-10 PROCEDURE — 97112 NEUROMUSCULAR REEDUCATION: CPT | Mod: GP

## 2025-06-10 PROCEDURE — 97110 THERAPEUTIC EXERCISES: CPT | Mod: GP
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
## 2025-06-10 NOTE — Clinical Note
Crarington Singh, Overall Destiny is doing much better in terms of management of her symptoms, but she is still having the most difficulty with any vestibular/ocular motor challenge.  We are going to continue to stress interventions that will improve her ability to bend over, track with her eyes, balance, and allow her to return back to her baseline with ADL's and IADL's.

## 2025-06-11 ENCOUNTER — APPOINTMENT (OUTPATIENT)
Dept: PRIMARY CARE | Facility: CLINIC | Age: 60
End: 2025-06-11
Payer: COMMERCIAL

## 2025-06-11 VITALS
SYSTOLIC BLOOD PRESSURE: 116 MMHG | HEART RATE: 81 BPM | BODY MASS INDEX: 28.51 KG/M2 | WEIGHT: 167 LBS | HEIGHT: 64 IN | TEMPERATURE: 98.1 F | DIASTOLIC BLOOD PRESSURE: 74 MMHG | OXYGEN SATURATION: 98 %

## 2025-06-11 DIAGNOSIS — G89.29 CHRONIC NONINTRACTABLE HEADACHE, UNSPECIFIED HEADACHE TYPE: ICD-10-CM

## 2025-06-11 DIAGNOSIS — F51.04 PSYCHOPHYSIOLOGICAL INSOMNIA: Primary | ICD-10-CM

## 2025-06-11 DIAGNOSIS — Z23 NEED FOR PNEUMOCOCCAL VACCINE: ICD-10-CM

## 2025-06-11 DIAGNOSIS — R51.9 CHRONIC NONINTRACTABLE HEADACHE, UNSPECIFIED HEADACHE TYPE: ICD-10-CM

## 2025-06-11 PROCEDURE — 90677 PCV20 VACCINE IM: CPT | Performed by: INTERNAL MEDICINE

## 2025-06-11 PROCEDURE — 90471 IMMUNIZATION ADMIN: CPT | Performed by: INTERNAL MEDICINE

## 2025-06-11 PROCEDURE — 3078F DIAST BP <80 MM HG: CPT | Performed by: INTERNAL MEDICINE

## 2025-06-11 PROCEDURE — 99213 OFFICE O/P EST LOW 20 MIN: CPT | Performed by: INTERNAL MEDICINE

## 2025-06-11 PROCEDURE — 1036F TOBACCO NON-USER: CPT | Performed by: INTERNAL MEDICINE

## 2025-06-11 PROCEDURE — 3008F BODY MASS INDEX DOCD: CPT | Performed by: INTERNAL MEDICINE

## 2025-06-11 PROCEDURE — 3074F SYST BP LT 130 MM HG: CPT | Performed by: INTERNAL MEDICINE

## 2025-06-11 ASSESSMENT — ENCOUNTER SYMPTOMS
PHOTOPHOBIA: 0
TROUBLE SWALLOWING: 0
ABDOMINAL PAIN: 0
FACIAL ASYMMETRY: 0
SEIZURES: 0
NUMBNESS: 0
DIARRHEA: 0
HALLUCINATIONS: 0
NECK PAIN: 0
ACTIVITY CHANGE: 0
FLANK PAIN: 0
UNEXPECTED WEIGHT CHANGE: 0
ADENOPATHY: 0
FATIGUE: 0
WEAKNESS: 0
DYSURIA: 0
DECREASED CONCENTRATION: 1
CONSTIPATION: 0
WOUND: 0
COLOR CHANGE: 0
FREQUENCY: 0
COUGH: 0
STRIDOR: 0
MYALGIAS: 0
WHEEZING: 0
PALPITATIONS: 0
BACK PAIN: 0
SPEECH DIFFICULTY: 0
NAUSEA: 0
DIZZINESS: 0
SINUS PAIN: 0
NERVOUS/ANXIOUS: 0
ARTHRALGIAS: 0
HEMATURIA: 0
EYE PAIN: 0
CHEST TIGHTNESS: 0
VOICE CHANGE: 0
FEVER: 0
JOINT SWELLING: 0
SORE THROAT: 0
SHORTNESS OF BREATH: 0
CONFUSION: 0
TREMORS: 0
APPETITE CHANGE: 0
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
## 2025-06-14 ASSESSMENT — ENCOUNTER SYMPTOMS
SLEEP DISTURBANCE: 1
HEADACHES: 1

## 2025-06-17 ENCOUNTER — TREATMENT (OUTPATIENT)
Dept: PHYSICAL THERAPY | Facility: CLINIC | Age: 60
End: 2025-06-17
Payer: COMMERCIAL
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
## 2025-06-17 DIAGNOSIS — S29.019A THORACIC MYOFASCIAL STRAIN, INITIAL ENCOUNTER: ICD-10-CM

## 2025-06-17 DIAGNOSIS — S16.1XXA CERVICAL STRAIN, ACUTE, INITIAL ENCOUNTER: ICD-10-CM

## 2025-06-17 PROCEDURE — 97110 THERAPEUTIC EXERCISES: CPT | Mod: GP

## 2025-06-17 PROCEDURE — 97112 NEUROMUSCULAR REEDUCATION: CPT | Mod: GP
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
## 2025-06-27 DIAGNOSIS — E03.9 ACQUIRED HYPOTHYROIDISM: ICD-10-CM

## 2025-07-01 ENCOUNTER — TREATMENT (OUTPATIENT)
Dept: PHYSICAL THERAPY | Facility: CLINIC | Age: 60
End: 2025-07-01
Payer: COMMERCIAL
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
## 2025-07-01 DIAGNOSIS — S16.1XXA CERVICAL STRAIN, ACUTE, INITIAL ENCOUNTER: ICD-10-CM

## 2025-07-01 DIAGNOSIS — S29.019A THORACIC MYOFASCIAL STRAIN, INITIAL ENCOUNTER: ICD-10-CM

## 2025-07-01 PROCEDURE — 97112 NEUROMUSCULAR REEDUCATION: CPT | Mod: GP

## 2025-07-01 PROCEDURE — 97110 THERAPEUTIC EXERCISES: CPT | Mod: GP
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
## 2025-07-06 RX ORDER — LEVOTHYROXINE SODIUM 50 UG/1
50 TABLET ORAL DAILY
Qty: 90 TABLET | Refills: 1 | Status: SHIPPED | OUTPATIENT
Start: 2025-07-06

## 2025-07-10 ENCOUNTER — TREATMENT (OUTPATIENT)
Dept: PHYSICAL THERAPY | Facility: CLINIC | Age: 60
End: 2025-07-10
Payer: COMMERCIAL

## 2025-07-10 DIAGNOSIS — S16.1XXA CERVICAL STRAIN, ACUTE, INITIAL ENCOUNTER: ICD-10-CM

## 2025-07-10 DIAGNOSIS — S29.019A THORACIC MYOFASCIAL STRAIN, INITIAL ENCOUNTER: ICD-10-CM
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
## 2025-07-10 PROCEDURE — 97110 THERAPEUTIC EXERCISES: CPT | Mod: GP

## 2025-07-10 PROCEDURE — 97112 NEUROMUSCULAR REEDUCATION: CPT | Mod: GP
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
## 2025-07-12 DIAGNOSIS — E03.9 ACQUIRED HYPOTHYROIDISM: ICD-10-CM

## 2025-07-14 ENCOUNTER — TELEPHONE (OUTPATIENT)
Dept: PRIMARY CARE | Facility: CLINIC | Age: 60
End: 2025-07-14
Payer: COMMERCIAL

## 2025-07-14 DIAGNOSIS — E03.9 ACQUIRED HYPOTHYROIDISM: ICD-10-CM

## 2025-07-14 RX ORDER — LEVOTHYROXINE SODIUM 50 UG/1
50 TABLET ORAL DAILY
Qty: 90 TABLET | Refills: 0 | OUTPATIENT
Start: 2025-07-14

## 2025-07-14 RX ORDER — LEVOTHYROXINE SODIUM 50 UG/1
50 TABLET ORAL DAILY
Qty: 90 TABLET | Refills: 1 | Status: SHIPPED | OUTPATIENT
Start: 2025-07-14

## 2025-07-14 NOTE — TELEPHONE ENCOUNTER
Pt received notification that rx was sent on 7/6/25 for synthroid but pharmacy don't have it and then pt received an email that it was denied. Can you look into this or possible resend to pharmacy.

## 2025-07-24 ENCOUNTER — TREATMENT (OUTPATIENT)
Dept: PHYSICAL THERAPY | Facility: CLINIC | Age: 60
End: 2025-07-24
Payer: COMMERCIAL
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
## 2025-07-24 DIAGNOSIS — S29.019A THORACIC MYOFASCIAL STRAIN, INITIAL ENCOUNTER: ICD-10-CM

## 2025-07-24 DIAGNOSIS — S16.1XXA CERVICAL STRAIN, ACUTE, INITIAL ENCOUNTER: ICD-10-CM

## 2025-07-24 PROCEDURE — 97112 NEUROMUSCULAR REEDUCATION: CPT | Mod: GP

## 2025-07-24 PROCEDURE — 97110 THERAPEUTIC EXERCISES: CPT | Mod: GP
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
## 2025-07-31 ENCOUNTER — TREATMENT (OUTPATIENT)
Dept: PHYSICAL THERAPY | Facility: CLINIC | Age: 60
End: 2025-07-31
Payer: COMMERCIAL

## 2025-07-31 DIAGNOSIS — S29.019A THORACIC MYOFASCIAL STRAIN, INITIAL ENCOUNTER: ICD-10-CM
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
## 2025-07-31 DIAGNOSIS — S16.1XXA CERVICAL STRAIN, ACUTE, INITIAL ENCOUNTER: ICD-10-CM

## 2025-07-31 PROCEDURE — 97112 NEUROMUSCULAR REEDUCATION: CPT | Mod: GP

## 2025-07-31 PROCEDURE — 97110 THERAPEUTIC EXERCISES: CPT | Mod: GP
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
## 2025-08-05 ENCOUNTER — TREATMENT (OUTPATIENT)
Dept: PHYSICAL THERAPY | Facility: CLINIC | Age: 60
End: 2025-08-05
Payer: COMMERCIAL

## 2025-08-05 DIAGNOSIS — S16.1XXA CERVICAL STRAIN, ACUTE, INITIAL ENCOUNTER: ICD-10-CM
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
## 2025-08-05 DIAGNOSIS — S29.019A THORACIC MYOFASCIAL STRAIN, INITIAL ENCOUNTER: ICD-10-CM

## 2025-08-05 PROCEDURE — 97110 THERAPEUTIC EXERCISES: CPT | Mod: GP

## 2025-08-05 PROCEDURE — 97112 NEUROMUSCULAR REEDUCATION: CPT | Mod: GP
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
## 2025-09-04 ENCOUNTER — TELEPHONE (OUTPATIENT)
Dept: PRIMARY CARE | Facility: CLINIC | Age: 60
End: 2025-09-04
Payer: COMMERCIAL

## 2025-09-04 DIAGNOSIS — E11.9 DIABETES MELLITUS WITHOUT COMPLICATION: ICD-10-CM

## 2025-09-04 RX ORDER — METFORMIN HYDROCHLORIDE 500 MG/1
500 TABLET ORAL
Qty: 180 TABLET | Refills: 1 | Status: SHIPPED | OUTPATIENT
Start: 2025-09-04 | End: 2026-03-03

## 2025-09-05 DIAGNOSIS — F41.9 ANXIETY AND DEPRESSION: ICD-10-CM

## 2025-09-05 DIAGNOSIS — F32.A ANXIETY AND DEPRESSION: ICD-10-CM

## 2025-09-05 RX ORDER — CITALOPRAM 20 MG/1
20 TABLET ORAL DAILY
Qty: 90 TABLET | Refills: 0 | Status: SHIPPED | OUTPATIENT
Start: 2025-09-05

## 2025-10-02 ENCOUNTER — APPOINTMENT (OUTPATIENT)
Dept: PRIMARY CARE | Facility: CLINIC | Age: 60
End: 2025-10-02
Payer: COMMERCIAL